# Patient Record
Sex: MALE | Race: WHITE | NOT HISPANIC OR LATINO | URBAN - METROPOLITAN AREA
[De-identification: names, ages, dates, MRNs, and addresses within clinical notes are randomized per-mention and may not be internally consistent; named-entity substitution may affect disease eponyms.]

---

## 2017-04-04 ENCOUNTER — OUTPATIENT (OUTPATIENT)
Dept: OUTPATIENT SERVICES | Facility: HOSPITAL | Age: 52
LOS: 1 days | Discharge: HOME | End: 2017-04-04

## 2017-04-04 DIAGNOSIS — Z98.89 OTHER SPECIFIED POSTPROCEDURAL STATES: Chronic | ICD-10-CM

## 2017-06-27 DIAGNOSIS — M54.2 CERVICALGIA: ICD-10-CM

## 2018-04-10 ENCOUNTER — EMERGENCY (EMERGENCY)
Facility: HOSPITAL | Age: 53
LOS: 0 days | Discharge: HOME | End: 2018-04-10
Attending: EMERGENCY MEDICINE | Admitting: INTERNAL MEDICINE

## 2018-04-10 VITALS
SYSTOLIC BLOOD PRESSURE: 139 MMHG | OXYGEN SATURATION: 97 % | DIASTOLIC BLOOD PRESSURE: 79 MMHG | RESPIRATION RATE: 18 BRPM | TEMPERATURE: 98 F | HEART RATE: 59 BPM

## 2018-04-10 VITALS
RESPIRATION RATE: 18 BRPM | OXYGEN SATURATION: 98 % | TEMPERATURE: 96 F | SYSTOLIC BLOOD PRESSURE: 146 MMHG | HEART RATE: 80 BPM | DIASTOLIC BLOOD PRESSURE: 94 MMHG

## 2018-04-10 DIAGNOSIS — Z88.5 ALLERGY STATUS TO NARCOTIC AGENT: ICD-10-CM

## 2018-04-10 DIAGNOSIS — I10 ESSENTIAL (PRIMARY) HYPERTENSION: ICD-10-CM

## 2018-04-10 DIAGNOSIS — Z98.89 OTHER SPECIFIED POSTPROCEDURAL STATES: Chronic | ICD-10-CM

## 2018-04-10 DIAGNOSIS — Z79.899 OTHER LONG TERM (CURRENT) DRUG THERAPY: ICD-10-CM

## 2018-04-10 DIAGNOSIS — R42 DIZZINESS AND GIDDINESS: ICD-10-CM

## 2018-04-10 DIAGNOSIS — R63.1 POLYDIPSIA: ICD-10-CM

## 2018-04-10 LAB
ACETONE SERPL-MCNC: NEGATIVE — SIGNIFICANT CHANGE UP
ALBUMIN SERPL ELPH-MCNC: 4.8 G/DL — SIGNIFICANT CHANGE UP (ref 3.5–5.2)
ALP SERPL-CCNC: 61 U/L — SIGNIFICANT CHANGE UP (ref 30–115)
ALT FLD-CCNC: 50 U/L — HIGH (ref 0–41)
ANION GAP SERPL CALC-SCNC: 12 MMOL/L — SIGNIFICANT CHANGE UP (ref 7–14)
APPEARANCE UR: (no result)
APTT BLD: 24.4 SEC — LOW (ref 27–39.2)
AST SERPL-CCNC: 42 U/L — HIGH (ref 0–41)
BACTERIA # UR AUTO: SIGNIFICANT CHANGE UP /HPF
BILIRUB SERPL-MCNC: 0.9 MG/DL — SIGNIFICANT CHANGE UP (ref 0.2–1.2)
BILIRUB UR-MCNC: NEGATIVE — SIGNIFICANT CHANGE UP
BUN SERPL-MCNC: 18 MG/DL — SIGNIFICANT CHANGE UP (ref 10–20)
CALCIUM SERPL-MCNC: 9.6 MG/DL — SIGNIFICANT CHANGE UP (ref 8.5–10.1)
CHLORIDE SERPL-SCNC: 97 MMOL/L — LOW (ref 98–110)
CK MB CFR SERPL CALC: <0.1 NG/ML — LOW (ref 0.6–6.3)
CK SERPL-CCNC: 55 U/L — SIGNIFICANT CHANGE UP (ref 0–225)
CO2 SERPL-SCNC: 30 MMOL/L — SIGNIFICANT CHANGE UP (ref 17–32)
COLOR SPEC: YELLOW — SIGNIFICANT CHANGE UP
CREAT SERPL-MCNC: 1 MG/DL — SIGNIFICANT CHANGE UP (ref 0.7–1.5)
DIFF PNL FLD: NEGATIVE — SIGNIFICANT CHANGE UP
EPI CELLS # UR: (no result) /HPF
GLUCOSE SERPL-MCNC: 108 MG/DL — HIGH (ref 70–99)
GLUCOSE UR QL: NEGATIVE MG/DL — SIGNIFICANT CHANGE UP
HCT VFR BLD CALC: 42.4 % — SIGNIFICANT CHANGE UP (ref 42–52)
HGB BLD-MCNC: 15.7 G/DL — SIGNIFICANT CHANGE UP (ref 14–18)
INR BLD: 1.1 RATIO — SIGNIFICANT CHANGE UP (ref 0.65–1.3)
KETONES UR-MCNC: NEGATIVE — SIGNIFICANT CHANGE UP
LEUKOCYTE ESTERASE UR-ACNC: NEGATIVE — SIGNIFICANT CHANGE UP
LIDOCAIN IGE QN: 35 U/L — SIGNIFICANT CHANGE UP (ref 7–60)
MCHC RBC-ENTMCNC: 33.4 PG — HIGH (ref 27–31)
MCHC RBC-ENTMCNC: 37 G/DL — SIGNIFICANT CHANGE UP (ref 32–37)
MCV RBC AUTO: 90.2 FL — SIGNIFICANT CHANGE UP (ref 80–94)
NITRITE UR-MCNC: NEGATIVE — SIGNIFICANT CHANGE UP
NRBC # BLD: 0 /100 WBCS — SIGNIFICANT CHANGE UP (ref 0–0)
PH UR: 6 — SIGNIFICANT CHANGE UP (ref 5–8)
PLATELET # BLD AUTO: 274 K/UL — SIGNIFICANT CHANGE UP (ref 130–400)
POTASSIUM SERPL-MCNC: 4.3 MMOL/L — SIGNIFICANT CHANGE UP (ref 3.5–5)
POTASSIUM SERPL-SCNC: 4.3 MMOL/L — SIGNIFICANT CHANGE UP (ref 3.5–5)
PROT SERPL-MCNC: 7.8 G/DL — SIGNIFICANT CHANGE UP (ref 6–8)
PROT UR-MCNC: NEGATIVE MG/DL — SIGNIFICANT CHANGE UP
PROTHROM AB SERPL-ACNC: 11.9 SEC — SIGNIFICANT CHANGE UP (ref 9.95–12.87)
RBC # BLD: 4.7 M/UL — SIGNIFICANT CHANGE UP (ref 4.7–6.1)
RBC # FLD: 11.7 % — SIGNIFICANT CHANGE UP (ref 11.5–14.5)
SODIUM SERPL-SCNC: 139 MMOL/L — SIGNIFICANT CHANGE UP (ref 135–146)
SP GR SPEC: 1.02 — SIGNIFICANT CHANGE UP (ref 1.01–1.03)
TROPONIN T SERPL-MCNC: <0.01 NG/ML — SIGNIFICANT CHANGE UP
UROBILINOGEN FLD QL: 1 MG/DL (ref 0.2–0.2)
WBC # BLD: 6.37 K/UL — SIGNIFICANT CHANGE UP (ref 4.8–10.8)
WBC # FLD AUTO: 6.37 K/UL — SIGNIFICANT CHANGE UP (ref 4.8–10.8)

## 2018-04-10 RX ORDER — MECLIZINE HCL 12.5 MG
50 TABLET ORAL ONCE
Qty: 0 | Refills: 0 | Status: COMPLETED | OUTPATIENT
Start: 2018-04-10 | End: 2018-04-10

## 2018-04-10 RX ORDER — SODIUM CHLORIDE 9 MG/ML
1000 INJECTION INTRAMUSCULAR; INTRAVENOUS; SUBCUTANEOUS ONCE
Qty: 0 | Refills: 0 | Status: COMPLETED | OUTPATIENT
Start: 2018-04-10 | End: 2018-04-10

## 2018-04-10 RX ORDER — MECLIZINE HCL 12.5 MG
2 TABLET ORAL
Qty: 42 | Refills: 0
Start: 2018-04-10 | End: 2018-04-16

## 2018-04-10 RX ADMIN — SODIUM CHLORIDE 1000 MILLILITER(S): 9 INJECTION INTRAMUSCULAR; INTRAVENOUS; SUBCUTANEOUS at 15:31

## 2018-04-10 RX ADMIN — Medication 50 MILLIGRAM(S): at 15:31

## 2018-04-10 NOTE — ED ADULT NURSE NOTE - PMH
Acid reflux disease    Allergic rhinitis, unspecified allergic rhinitis type    HLD (hyperlipidemia)    HTN (hypertension)

## 2018-04-10 NOTE — ED PROVIDER NOTE - MEDICAL DECISION MAKING DETAILS
please see progress notes, pt reports feeling much better, repeat exam with no nystagmus, nih 0, aware of signs and symptoms to return for, will follow up.

## 2018-04-10 NOTE — ED PROVIDER NOTE - NS ED ROS FT
(+) dizziness like room is spinning, nausea, polydipsia. denies fever, chills, vomiting, cp, sob, pleuritic cp, palpitations, diaphoresis, cough, tinnitus, ear pain, hearing loss, neck pain/stiffness, (has chronic back pain from herniated discs), photophobia/phonophobia, blurry vision/visual changes, abd pain, current diarrhea, constipation, melena/brbpr, urinary symptoms, weakness, numbness/tingling, HA, syncope, sick contacts, recent travel or rash. ex-smoker- quite seven years ago.

## 2018-04-10 NOTE — ED ADULT TRIAGE NOTE - CHIEF COMPLAINT QUOTE
Pt started on diclofenac and HCTZ two weeks ago, pt c/o dizziness and nausea, cold sweats on Saturday while out to eat, pt also reports similar symptoms prior to starting medication with palpitations, now c/o decreased appetite and dizziness

## 2018-04-10 NOTE — ED PROVIDER NOTE - OBJECTIVE STATEMENT
A 53 y/o m w/ pmhx of herniated discs, tendonitis of L wrist (recently started on diclofenac 75 mg twice a day on april 2nd) and htn (recently started on hctx 25 mg once a day on april 2nd) presents for dizziness like the room is spinning associated with nausea, and polydipsia. pt reports has felt this once before and was seen by his pmd Dr. Ortiz on march 26th, had blood drawn and when he followed on april 2nd was started on the meds as was todl he had htn and dld. symptoms worsened this saturday (~ 4 days ago), has been intermittent, had diarrhea on friday. was told not diabetic. denies fever, chills, vomiting, cp, sob, pleuritic cp, palpitations, diaphoresis, cough, tinnitus, ear pain, hearing loss, neck pain/stiffness, (has chronic back pain from herniated discs), photophobia/phonophobia, blurry vision/visual changes, abd pain, current diarrhea, constipation, melena/brbpr, urinary symptoms, weakness, numbness/tingling, HA, syncope, sick contacts, recent travel or rash. ex-smoker- quite seven years ago. pt was concerned may be reaction to the meds he was statrted on so came to ed.

## 2018-04-10 NOTE — ED PROVIDER NOTE - PHYSICAL EXAMINATION
on exam: wdwn male sitting on stretcher in nad,no rash, no signs of trauma, PERRL, EOM intact, (+) L sided Horizontal nystagmus that fatigues, TM's visualzied b/l w/ good cone of light, no erythema or effusions, no cerumen impaction, mmm, neck supple, no spinous ttp to neck or back, FROM, no palpable shelves or step offs, no meningeal signs, regular rate, radial pulses 2/4 b/l, ctabl w/ breath sounds present b/l, no wheezing or crackles, good air exchange, good respiratory effort, no accessory muscle use, no tachypnea, no stridor, bs present throughout all 4 quadrants, abd soft, nd, nt, no rebound tenderness or guarding, no cvat, FROM of upper an lower ext, no calf pain/swelling/erythema, AAOx3. Motor 5/5 and sensation intact throughout upper and lower ext. No drift, CN II-XII intact. No facial droop or slurring of speech. (-) Pronator (-) Romberg, no dysmetria w/ ftn or rapid alternating fine movements, heel to shin intact, ambulating with no ataxia or difficulty.NIH O.

## 2018-04-10 NOTE — ED PROVIDER NOTE - CARE PLAN
Assessment and plan of treatment:	Plan: EKG, CXR, labs, ct head, urine, fluids, meclizine, reassess. Principal Discharge DX:	Dizziness  Assessment and plan of treatment:	Plan: EKG, CXR, labs, ct head, urine, fluids, meclizine, reassess.  Secondary Diagnosis:	Nausea

## 2018-05-10 ENCOUNTER — APPOINTMENT (OUTPATIENT)
Dept: CARDIOLOGY | Facility: CLINIC | Age: 53
End: 2018-05-10

## 2018-05-10 VITALS
WEIGHT: 182 LBS | HEART RATE: 58 BPM | DIASTOLIC BLOOD PRESSURE: 80 MMHG | HEIGHT: 68 IN | BODY MASS INDEX: 27.58 KG/M2 | SYSTOLIC BLOOD PRESSURE: 102 MMHG

## 2018-06-13 ENCOUNTER — APPOINTMENT (OUTPATIENT)
Dept: CARDIOLOGY | Facility: CLINIC | Age: 53
End: 2018-06-13

## 2018-06-20 NOTE — ED PROVIDER NOTE - PROGRESS NOTE DETAILS
actual pt reports feeling much better, no symptoms, no dizziness, no nausea or vomiting, all labs results printed and reviewed, pt aware of imp of follow up with pmd, neuology, will also give ent, reports meclizine helped will give perscription as discussed, aware of signs and symptoms to return for, will follow up. standing

## 2018-08-15 ENCOUNTER — APPOINTMENT (OUTPATIENT)
Dept: CARDIOLOGY | Facility: CLINIC | Age: 53
End: 2018-08-15

## 2018-08-15 VITALS
HEIGHT: 68 IN | HEART RATE: 68 BPM | BODY MASS INDEX: 26.83 KG/M2 | SYSTOLIC BLOOD PRESSURE: 130 MMHG | DIASTOLIC BLOOD PRESSURE: 82 MMHG | WEIGHT: 177 LBS

## 2018-08-15 PROBLEM — I10 ESSENTIAL (PRIMARY) HYPERTENSION: Chronic | Status: ACTIVE | Noted: 2018-04-10

## 2018-12-13 ENCOUNTER — APPOINTMENT (OUTPATIENT)
Dept: CARDIOLOGY | Facility: CLINIC | Age: 53
End: 2018-12-13

## 2019-03-29 ENCOUNTER — OUTPATIENT (OUTPATIENT)
Dept: OUTPATIENT SERVICES | Facility: HOSPITAL | Age: 54
LOS: 1 days | Discharge: HOME | End: 2019-03-29

## 2019-03-29 DIAGNOSIS — Z00.00 ENCOUNTER FOR GENERAL ADULT MEDICAL EXAMINATION WITHOUT ABNORMAL FINDINGS: ICD-10-CM

## 2019-03-29 DIAGNOSIS — Z98.89 OTHER SPECIFIED POSTPROCEDURAL STATES: Chronic | ICD-10-CM

## 2019-03-29 DIAGNOSIS — N40.0 BENIGN PROSTATIC HYPERPLASIA WITHOUT LOWER URINARY TRACT SYMPTOMS: ICD-10-CM

## 2019-03-29 DIAGNOSIS — E78.00 PURE HYPERCHOLESTEROLEMIA, UNSPECIFIED: ICD-10-CM

## 2019-03-29 DIAGNOSIS — D51.0 VITAMIN B12 DEFICIENCY ANEMIA DUE TO INTRINSIC FACTOR DEFICIENCY: ICD-10-CM

## 2020-01-07 ENCOUNTER — APPOINTMENT (OUTPATIENT)
Dept: PLASTIC SURGERY | Facility: CLINIC | Age: 55
End: 2020-01-07
Payer: OTHER MISCELLANEOUS

## 2020-01-07 VITALS — HEIGHT: 68 IN | WEIGHT: 177 LBS | BODY MASS INDEX: 26.83 KG/M2

## 2020-01-07 PROCEDURE — 99203 OFFICE O/P NEW LOW 30 MIN: CPT

## 2020-01-07 RX ORDER — FLUTICASONE PROPIONATE 50 UG/1
50 SPRAY, METERED NASAL DAILY
Qty: 1 | Refills: 2 | Status: DISCONTINUED | COMMUNITY
End: 2020-01-07

## 2020-01-07 RX ORDER — MECLIZINE HYDROCHLORIDE 25 MG/1
25 TABLET ORAL
Refills: 0 | Status: DISCONTINUED | COMMUNITY
End: 2020-01-07

## 2020-01-07 NOTE — HISTORY OF PRESENT ILLNESS
[FreeTextEntry1] : Pt is a 55 y/o M with PMH of GERD and chronic back pain, RHD, who presents for evaluation of BL hand pain which he reports started after wrapping electrical wires with tape at work March 2019. Pt saw Dr. Hardin and was dx with BL BJA and cubital tunnel syndrome; he given BL kenalog injections and advised to wear BL thumb spicas. He states Dr. Hardin offered surgery but reports he would not be able to return to work after.\par \par Now c/o BL hand numbness and thumb pain. Here for second opinion.\par \par Quit smoking 7 years ago\par Nondiabetic

## 2020-01-07 NOTE — PHYSICAL EXAM
[de-identified] : well-appearing, NAD [de-identified] : BL hands with FROM, diminished sensation to ulnar 2 digits, slightly weakly finger abduction, +Tinels at elbow, negative Phalen's, + grind test BL

## 2020-01-07 NOTE — ASSESSMENT
[FreeTextEntry1] : 53 y/o M with BL hand pain c/w cubital tunnel syndrome and BJA s/p kenalog x1 to each joint and thumb spica\par \par as above\par on exam B/L BJA\par also signs B/L CTS\par AIN intact  UNM intact  slightly diminished sensation ulnar digits B/L\par \par suggest repeat xrays and B/L EMG\par \par all ?s answered\par \par f/u afetr studies

## 2020-06-04 ENCOUNTER — APPOINTMENT (OUTPATIENT)
Dept: PLASTIC SURGERY | Facility: CLINIC | Age: 55
End: 2020-06-04
Payer: OTHER MISCELLANEOUS

## 2020-06-04 PROCEDURE — 20550 NJX 1 TENDON SHEATH/LIGAMENT: CPT

## 2020-06-04 PROCEDURE — 99212 OFFICE O/P EST SF 10 MIN: CPT | Mod: 25

## 2020-06-04 NOTE — PHYSICAL EXAM
[de-identified] : BL hands with FROM, diminished sensation to ulnar 2 digits, slightly weakly finger abduction, +Tinels at elbow, negative Phalen's, + grind test BL [de-identified] : well-appearing, NAD

## 2020-06-04 NOTE — ASSESSMENT
[FreeTextEntry1] : 53 y/o M with BL hand pain c/w cubital tunnel syndrome and BJA s/p kenalog x1 to each joint and thumb spica\par \par as above\par on exam B/L BJA\par also signs B/L CTS\par AIN intact  UNM intact  slightly diminished sensation ulnar digits B/L\par \par suggest repeat xrays and B/L EMG\par \par all ?s answered\par \par f/u afetr studies\par \par as above\par \par bilateral hand sxs have not changed\par EMG obtained but full report not available\par xrays obtained but images not available\par \par 5mg kenalog injected into bilateral basal joints--tolerated well\par soft thumb spica splint\par \par f/u in 6 wks\par will need to view images and see full EMG report

## 2020-06-05 NOTE — ED ADULT TRIAGE NOTE - TEMPERATURE IN FAHRENHEIT (DEGREES F)
Pt here for [address].   Arrives Ambulating independently, accompanied by Self           Patient reports possible pregnancy since last therapy cycle: No    Modifications in dose or schedule: No     Frequency of blood return and site check throughout admini
96.3

## 2020-07-16 ENCOUNTER — APPOINTMENT (OUTPATIENT)
Dept: PLASTIC SURGERY | Facility: CLINIC | Age: 55
End: 2020-07-16
Payer: OTHER MISCELLANEOUS

## 2020-07-16 DIAGNOSIS — M79.641 PAIN IN RIGHT HAND: ICD-10-CM

## 2020-07-16 DIAGNOSIS — M79.642 PAIN IN RIGHT HAND: ICD-10-CM

## 2020-07-16 PROCEDURE — 99212 OFFICE O/P EST SF 10 MIN: CPT | Mod: 25

## 2020-07-16 PROCEDURE — 20550 NJX 1 TENDON SHEATH/LIGAMENT: CPT

## 2020-07-16 NOTE — HISTORY OF PRESENT ILLNESS
[FreeTextEntry1] : Pt is a 55 y/o M with PMH of GERD and chronic back pain, RHD, who presents for evaluation of BL hand pain which he reports started after wrapping electrical wires with tape at work March 2019. Pt saw Dr. Hardin and was dx with BL BJA and cubital tunnel syndrome; he given BL kenalog injections and advised to wear BL thumb spicas. He states Dr. Hardin offered surgery but reports he would not be able to return to work after.\par \par Now c/o BL hand numbness and thumb pain. Here for second opinion.\par \par Quit smoking 7 years ago\par Nondiabetic\par \par Interval hx (7/16/20). Patient presents today for f/u to discuss treatment. EMG from February 2020 revealed bilateral cubital tunnel syndrome, no CTS. Reports improvement in basal joint pain after Kenalog injection last visit.

## 2020-07-16 NOTE — ASSESSMENT
[FreeTextEntry1] : 53 y/o M with BL hand pain c/w cubital tunnel syndrome and BJA s/p kenalog x2 to each joint and thumb spica.\par \par EMG reviewed--bilateral cubital tunnel syndrome\par \par suggest decompressions\par \par Regarding the hand surgery, we discussed the risk of bleeding, infection, need for additional unplanned surgery, need for postoperative hand occupational therapy, possible lack of improvement and diminished hand function.  We discussed prolonged recovery.  All questions were answered and all risks were well understood by the patient.\par \par had positive repsonse to prior BJ injection (50% better)\par requests additional injections today\par \par injected 5mg kenalog in to bilaterl BJ--tolerated fine\par \par scheduleing for cubitral tunnel surgery \par \par \par \par \par \par

## 2020-07-16 NOTE — PHYSICAL EXAM
[de-identified] : BL hands with FROM, diminished sensation to ulnar 2 digits, slightly weakly finger abduction, +Tinels at elbow, negative Phalen's, + grind test BL [de-identified] : well-appearing, NAD

## 2020-09-18 ENCOUNTER — OUTPATIENT (OUTPATIENT)
Dept: OUTPATIENT SERVICES | Facility: HOSPITAL | Age: 55
LOS: 1 days | Discharge: HOME | End: 2020-09-18
Payer: OTHER MISCELLANEOUS

## 2020-09-18 VITALS
OXYGEN SATURATION: 96 % | HEART RATE: 62 BPM | DIASTOLIC BLOOD PRESSURE: 90 MMHG | WEIGHT: 185.19 LBS | SYSTOLIC BLOOD PRESSURE: 143 MMHG | TEMPERATURE: 98 F | HEIGHT: 70 IN | RESPIRATION RATE: 16 BRPM

## 2020-09-18 DIAGNOSIS — I26.99 OTHER PULMONARY EMBOLISM WITHOUT ACUTE COR PULMONALE: Chronic | ICD-10-CM

## 2020-09-18 DIAGNOSIS — Z01.818 ENCOUNTER FOR OTHER PREPROCEDURAL EXAMINATION: ICD-10-CM

## 2020-09-18 DIAGNOSIS — Z87.19 PERSONAL HISTORY OF OTHER DISEASES OF THE DIGESTIVE SYSTEM: Chronic | ICD-10-CM

## 2020-09-18 DIAGNOSIS — Z98.89 OTHER SPECIFIED POSTPROCEDURAL STATES: Chronic | ICD-10-CM

## 2020-09-18 DIAGNOSIS — Z87.81 PERSONAL HISTORY OF (HEALED) TRAUMATIC FRACTURE: Chronic | ICD-10-CM

## 2020-09-18 DIAGNOSIS — G56.23 LESION OF ULNAR NERVE, BILATERAL UPPER LIMBS: ICD-10-CM

## 2020-09-18 LAB
APTT BLD: 27.2 SEC — SIGNIFICANT CHANGE UP (ref 27–39.2)
INR BLD: 1.02 RATIO — SIGNIFICANT CHANGE UP (ref 0.65–1.3)
PROTHROM AB SERPL-ACNC: 11.7 SEC — SIGNIFICANT CHANGE UP (ref 9.95–12.87)

## 2020-09-18 PROCEDURE — 93010 ELECTROCARDIOGRAM REPORT: CPT

## 2020-09-18 NOTE — H&P PST ADULT - HISTORY OF PRESENT ILLNESS
Pt states he has chronic back pain and also has bilateral numbness, pain and weakness of his arms to the point he can't complete his job duties as a  for [employer]. Above procedure was recommended by his surgeon.  PATIENT CURRENTLY DENIES CHEST PAIN , SHORTNESS OF BREATH,  PALPITATIONS,  DYSURIA, OR UPPER RESPIRATORY INFECTION IN PAST 2 WEEKS. EXERCISE  TOLERANCE  1-2 FLIGHT OF STAIRS  WITHOUT SHORTNESS OF BREATH.  Anesthesia Alert  NO--Difficult Airway  NO--History of neck surgery or radiation  NO--Limited ROM of neck  NO--History of Malignant hyperthermia  NO--Personal or family history of Pseudocholinesterase deficiency  NO--Prior Anesthesia Complication  NO--Latex Allergy  NO--Loose teeth  NO--History of Rheumatoid Arthritis  NO--GLADIS  NO--Other_____

## 2020-09-18 NOTE — H&P PST ADULT - NSANTHOSAYNRD_GEN_A_CORE
No. GLADIS screening performed.  STOP BANG Legend: 0-2 = LOW Risk; 3-4 = INTERMEDIATE Risk; 5-8 = HIGH Risk

## 2020-09-18 NOTE — H&P PST ADULT - REASON FOR ADMISSION
Pleasant 56 yo here for PAST for Decompression of Bilateral Ulnar Nerves of Elbow under GA by Dr Farah on 9/28 at [facility]

## 2020-09-18 NOTE — H&P PST ADULT - NSICDXPASTSURGICALHX_GEN_ALL_CORE_FT
PAST SURGICAL HISTORY:  H/O hiatal hernia mom    History of broken nose     History of hernia surgery incarcerated inguinal hernia 5 years ago    History of right knee surgery minescus repair

## 2020-09-18 NOTE — H&P PST ADULT - NSICDXFAMILYHX_GEN_ALL_CORE_FT
FAMILY HISTORY:  Family history of thrombosis or embolism, DVT -dad  FH: diabetes mellitus, dad  FH: heart disease, dad  FH: HTN (hypertension), both parents  FH: thyroid disease

## 2020-09-25 ENCOUNTER — LABORATORY RESULT (OUTPATIENT)
Age: 55
End: 2020-09-25

## 2020-09-25 ENCOUNTER — OUTPATIENT (OUTPATIENT)
Dept: OUTPATIENT SERVICES | Facility: HOSPITAL | Age: 55
LOS: 1 days | Discharge: HOME | End: 2020-09-25

## 2020-09-25 DIAGNOSIS — Z11.59 ENCOUNTER FOR SCREENING FOR OTHER VIRAL DISEASES: ICD-10-CM

## 2020-09-25 DIAGNOSIS — Z87.81 PERSONAL HISTORY OF (HEALED) TRAUMATIC FRACTURE: Chronic | ICD-10-CM

## 2020-09-25 DIAGNOSIS — Z87.19 PERSONAL HISTORY OF OTHER DISEASES OF THE DIGESTIVE SYSTEM: Chronic | ICD-10-CM

## 2020-09-25 DIAGNOSIS — Z98.89 OTHER SPECIFIED POSTPROCEDURAL STATES: Chronic | ICD-10-CM

## 2020-09-25 NOTE — ASU PATIENT PROFILE, ADULT - PSH
H/O hiatal hernia  mom  History of broken nose    History of hernia surgery  incarcerated inguinal hernia 5 years ago  History of right knee surgery  minescus repair

## 2020-09-28 ENCOUNTER — APPOINTMENT (OUTPATIENT)
Dept: PLASTIC SURGERY | Facility: AMBULATORY SURGERY CENTER | Age: 55
End: 2020-09-28
Payer: OTHER MISCELLANEOUS

## 2020-09-28 ENCOUNTER — OUTPATIENT (OUTPATIENT)
Dept: OUTPATIENT SERVICES | Facility: HOSPITAL | Age: 55
LOS: 1 days | Discharge: HOME | End: 2020-09-28

## 2020-09-28 VITALS
WEIGHT: 185.19 LBS | HEART RATE: 67 BPM | TEMPERATURE: 98 F | HEIGHT: 70 IN | SYSTOLIC BLOOD PRESSURE: 156 MMHG | OXYGEN SATURATION: 96 % | DIASTOLIC BLOOD PRESSURE: 95 MMHG | RESPIRATION RATE: 17 BRPM

## 2020-09-28 VITALS
RESPIRATION RATE: 15 BRPM | HEART RATE: 60 BPM | OXYGEN SATURATION: 98 % | SYSTOLIC BLOOD PRESSURE: 156 MMHG | DIASTOLIC BLOOD PRESSURE: 89 MMHG

## 2020-09-28 DIAGNOSIS — Z98.89 OTHER SPECIFIED POSTPROCEDURAL STATES: Chronic | ICD-10-CM

## 2020-09-28 DIAGNOSIS — Z87.19 PERSONAL HISTORY OF OTHER DISEASES OF THE DIGESTIVE SYSTEM: Chronic | ICD-10-CM

## 2020-09-28 DIAGNOSIS — Z87.81 PERSONAL HISTORY OF (HEALED) TRAUMATIC FRACTURE: Chronic | ICD-10-CM

## 2020-09-28 PROCEDURE — 64718 REVISE ULNAR NERVE AT ELBOW: CPT

## 2020-09-28 RX ORDER — HYDROMORPHONE HYDROCHLORIDE 2 MG/ML
0.5 INJECTION INTRAMUSCULAR; INTRAVENOUS; SUBCUTANEOUS
Refills: 0 | Status: DISCONTINUED | OUTPATIENT
Start: 2020-09-28 | End: 2020-09-28

## 2020-09-28 RX ORDER — SODIUM CHLORIDE 9 MG/ML
1000 INJECTION, SOLUTION INTRAVENOUS
Refills: 0 | Status: DISCONTINUED | OUTPATIENT
Start: 2020-09-28 | End: 2020-10-12

## 2020-09-28 RX ORDER — ONDANSETRON 8 MG/1
4 TABLET, FILM COATED ORAL ONCE
Refills: 0 | Status: DISCONTINUED | OUTPATIENT
Start: 2020-09-28 | End: 2020-10-12

## 2020-09-28 RX ORDER — TRAMADOL HYDROCHLORIDE 50 MG/1
1 TABLET ORAL
Qty: 10 | Refills: 0
Start: 2020-09-28

## 2020-09-28 RX ADMIN — SODIUM CHLORIDE 100 MILLILITER(S): 9 INJECTION, SOLUTION INTRAVENOUS at 14:10

## 2020-09-28 NOTE — ASU DISCHARGE PLAN (ADULT/PEDIATRIC) - ASU DC SPECIAL INSTRUCTIONSFT
Diet: You may resume your usual diet. Avoid alcohol and excessive salt intake for two weeks following surgery. This will help to minimize swelling.    Medication: Pain medication (Tramadol) has also been sent to your pharmacy for any moderate to severe pain you may experience. Alternatively, you may take Tylenol for mild discomfort. Remember, no Aspirin or NSAIDS (Advil, Motrin, Aleve) as they may increase bruising.    Activity: Keep your arms elevated as much as possible to reduce swelling. When sitting down, use pillows to elevate. You may take care of your personal needs as desired, however, no lifting or strenuous activity is allowed for 4 weeks following surgery. Driving is not permitted for at least two weeks.    Wound care: Keep your dressing clean, dry, and intact until seen by MD/PA. Do not smoke! It delays wound healing and increases the risk of complications.    Personal Hygiene: Do not get the operative area wet. You are allowed to sponge bathe. No tub soaking. You may shower provided the hand is wrapped with a plastic bag.    Things to expect: The operative area may be bruised, swollen, and painful. You may also have temporary numbness and stiffness which will improve over time. Hand therapy will be helpful to obtain maximum function and full range of motion. It will be started after the first or second post-operative visit.    In case of emergency: call the office any time day or night. Post-operative care will be provided in the office one week following surgery. If you do not already have an appointment, please call during regular office hours to schedule: [phone].     We wish you a pleasant recovery.

## 2020-09-28 NOTE — BRIEF OPERATIVE NOTE - NSICDXBRIEFPROCEDURE_GEN_ALL_CORE_FT
PROCEDURES:  Decompression of both ulnar nerves at elbow 28-Sep-2020 14:03:20  Tamela Mcallister L

## 2020-09-28 NOTE — CHART NOTE - NSCHARTNOTEFT_GEN_A_CORE
PACU ANESTHESIA ADMISSION NOTE      Procedure: Decompression of both ulnar nerves at elbow      Post op diagnosis:  Cubital tunnel syndrome, bilateral        ____  Intubated  TV:______       Rate: ______      FiO2: ______    __x__  Patent Airway    ___x_  Full return of protective reflexes    __x__  Full recovery from anesthesia / back to baseline     Vitals:   T:  97.5         R:   14               BP:   126/74               Sat:  99                 P: 63      Mental Status:  ___x_ Awake   ___x__ Alert   _____ Drowsy   _____ Sedated    Nausea/Vomiting:  __x__ NO  ______Yes,   See Post - Op Orders          Pain Scale (0-10):  _____    Treatment: _x___ None    ____ See Post - Op/PCA Orders    Post - Operative Fluids:   ____ Oral   __x__ See Post - Op Orders    Plan: Discharge:   __x__Home       _____Floor     _____Critical Care    _____  Other:_________________    Comments:  Pt brought to RR spontaneously breathing, hemodynamically stable

## 2020-10-05 ENCOUNTER — APPOINTMENT (OUTPATIENT)
Dept: PLASTIC SURGERY | Facility: CLINIC | Age: 55
End: 2020-10-05
Payer: COMMERCIAL

## 2020-10-05 PROCEDURE — 99024 POSTOP FOLLOW-UP VISIT: CPT

## 2020-10-05 NOTE — HISTORY OF PRESENT ILLNESS
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 Pt saw Dr. Hardin and was dx with BL BJA and cubital tunnel syndrome; he given BL kenalog injections and advised to wear BL thumb spicas. He states Dr. Hardin offered surgery but reports he would not be able to return to work after.\par \par Now c/o BL hand numbness and thumb pain. Here for second opinion.\par \par Quit smoking 7 years ago\par Nondiabetic\par \par Interval hx (7/16/20). Patient presents today for f/u to discuss treatment. EMG from February 2020 revealed bilateral cubital tunnel syndrome, no CTS. Reports improvement in basal joint pain after Kenalog injection last visit. \par \par Interval hx (10/5/20). Patient presents today POD#7 s/p BL cubital tunnel release. Doing well with appropriate incisional discomfort. Denies any fever, chills or bleeding.

## 2020-10-05 NOTE — ASSESSMENT
[FreeTextEntry1] : 56 y/o M with BL hand pain c/w cubital tunnel syndrome and BJA s/p kenalog x2 to each joint and thumb spica.\par Now POD#7 s/p BL release of cubital tunnel. Doing well. \par \par - dressings changed\par - post-op instructions discussed\par - f/u next week for suture removal

## 2020-10-05 NOTE — PHYSICAL EXAM
[de-identified] : well-appearing, NAD [de-identified] : BL hands with FROM, b/l elbow incisions healing appropriately, c/d/i, no cellulitis or fluid collection

## 2020-10-11 DIAGNOSIS — G56.23 LESION OF ULNAR NERVE, BILATERAL UPPER LIMBS: ICD-10-CM

## 2020-10-11 DIAGNOSIS — K21.9 GASTRO-ESOPHAGEAL REFLUX DISEASE WITHOUT ESOPHAGITIS: ICD-10-CM

## 2020-10-11 DIAGNOSIS — Z88.5 ALLERGY STATUS TO NARCOTIC AGENT: ICD-10-CM

## 2020-10-11 DIAGNOSIS — E78.5 HYPERLIPIDEMIA, UNSPECIFIED: ICD-10-CM

## 2020-10-11 DIAGNOSIS — Z87.891 PERSONAL HISTORY OF NICOTINE DEPENDENCE: ICD-10-CM

## 2020-10-11 DIAGNOSIS — I10 ESSENTIAL (PRIMARY) HYPERTENSION: ICD-10-CM

## 2020-10-14 ENCOUNTER — APPOINTMENT (OUTPATIENT)
Dept: PLASTIC SURGERY | Facility: CLINIC | Age: 55
End: 2020-10-14
Payer: COMMERCIAL

## 2020-10-14 PROCEDURE — 99024 POSTOP FOLLOW-UP VISIT: CPT

## 2020-10-14 NOTE — ASSESSMENT
[FreeTextEntry1] : 54 y/o M with BL hand pain c/w cubital tunnel syndrome and BJA s/p kenalog x2 to each joint and thumb spica.\par Now POD#15 s/p BL release of cubital tunnel. Doing well. \par \par -Sutures removed\par -Steri strips applied\par -OT rx given\par -F/u 6 weeks with Dr. Farah\par \par

## 2020-10-14 NOTE — HISTORY OF PRESENT ILLNESS
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 Pt saw Dr. Hardin and was dx with BL BJA and cubital tunnel syndrome; he given BL kenalog injections and advised to wear BL thumb spicas. He states Dr. Hardin offered surgery but reports he would not be able to return to work after.\par \par Now c/o BL hand numbness and thumb pain. Here for second opinion.\par \par Quit smoking 7 years ago\par Nondiabetic\par \par Interval hx (7/16/20). Patient presents today for f/u to discuss treatment. EMG from February 2020 revealed bilateral cubital tunnel syndrome, no CTS. Reports improvement in basal joint pain after Kenalog injection last visit. \par \par Interval hx (10/5/20). Patient presents today POD#7 s/p BL cubital tunnel release. Doing well with appropriate incisional discomfort. Denies any fever, chills or bleeding. \par \par Interval hx (10/14/20). Patient presents today POD#16 s/p BL cubital tunnel release. Offers no complaints.

## 2020-10-14 NOTE — PHYSICAL EXAM
[de-identified] : well-appearing, NAD [de-identified] : BL hands with FROM, b/l elbow incisions healing appropriately, c/d/i, no cellulitis or fluid collection

## 2020-11-24 ENCOUNTER — APPOINTMENT (OUTPATIENT)
Dept: PLASTIC SURGERY | Facility: CLINIC | Age: 55
End: 2020-11-24
Payer: OTHER MISCELLANEOUS

## 2020-11-24 DIAGNOSIS — G56.20 LESION OF ULNAR NERVE, UNSPECIFIED UPPER LIMB: ICD-10-CM

## 2020-11-24 PROCEDURE — 20550 NJX 1 TENDON SHEATH/LIGAMENT: CPT | Mod: 58

## 2020-11-24 PROCEDURE — 99024 POSTOP FOLLOW-UP VISIT: CPT

## 2020-11-24 NOTE — PHYSICAL EXAM
[de-identified] : well-appearing, NAD [de-identified] : BL hands with FROM, b/l elbow incisions healing appropriately, scars soft and flat

## 2020-11-24 NOTE — ASSESSMENT
[FreeTextEntry1] : 56 y/o M with BL hand pain c/w cubital tunnel syndrome and BJA s/p kenalog x2 to each joint and thumb spica.\par Now 2 months s/p BL release of cubital tunnel. Doing well. \par \par Due to COVID 19, pre-visit patient instructions were explained to the patient and their symptoms were checked upon arrival.  \par Masks were used by the health care providers and staff and the examination room was cleaned after the patient visit was completed.\par \par doing very well s/p cubital tunnel syndrome approx 7 wks ago--doing very well\par \par now w return of B/L BJA\par \par injected 5mg kenalog into B/L BJ\par lawanda well\par soft thumb spica splints\par

## 2020-11-24 NOTE — HISTORY OF PRESENT ILLNESS
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 Pt saw Dr. Hardin and was dx with BL BJA and cubital tunnel syndrome; he given BL kenalog injections and advised to wear BL thumb spicas. He states Dr. Hardin offered surgery but reports he would not be able to return to work after.\par \par Now c/o BL hand numbness and thumb pain. Here for second opinion.\par \par Quit smoking 7 years ago\par Nondiabetic\par \par Interval hx (7/16/20). Patient presents today for f/u to discuss treatment. EMG from February 2020 revealed bilateral cubital tunnel syndrome, no CTS. Reports improvement in basal joint pain after Kenalog injection last visit. \par \par Interval hx (10/5/20). Patient presents today POD#7 s/p BL cubital tunnel release. Doing well with appropriate incisional discomfort. Denies any fever, chills or bleeding. \par \par Interval hx (10/14/20): Patient presents today POD#16 s/p BL cubital tunnel release. Offers no complaints.\par \par Interval hx (11/24/20): Pt presents today 2 months s/p BL cubital tunnel release. Going to OT with improvement. Still c/o occasional BL elbow pain and electric shock sensation when touched.

## 2021-01-19 ENCOUNTER — APPOINTMENT (OUTPATIENT)
Dept: PLASTIC SURGERY | Facility: CLINIC | Age: 56
End: 2021-01-19
Payer: OTHER MISCELLANEOUS

## 2021-01-19 PROCEDURE — 99212 OFFICE O/P EST SF 10 MIN: CPT

## 2021-01-19 PROCEDURE — 99072 ADDL SUPL MATRL&STAF TM PHE: CPT

## 2021-01-19 NOTE — ASSESSMENT
[FreeTextEntry1] : 56 y/o M with BL hand pain c/w cubital tunnel syndrome and BJA s/p kenalog x2 to each joint and thumb spica.\par Now 2 months s/p BL release of cubital tunnel. Doing well. \par \par Due to COVID 19, pre-visit patient instructions were explained to the patient and their symptoms were checked upon arrival.  \par Masks were used by the health care providers and staff and the examination room was cleaned after the patient visit was completed.\par \par doing very well s/p cubital tunnel syndrome approx 7 wks ago--doing very well\par \par now w return of B/L BJA\par \par injected 5mg kenalog into B/L BJ\par lawanda well\par soft thumb spica splints\par \par \par doing Hand OPT w Bhavik for B/L cubtial tunnel\par \par numbeness and hand f(x) improving dramatically\par no issues\par \par f/u 6 months or prn\par \par Due to COVID 19, pre-visit patient instructions were explained to the patient and their symptoms were checked upon arrival.  \par Masks were used by the health care providers and staff and the examination room was cleaned after the patient visit was completed.\par \par

## 2021-01-19 NOTE — PHYSICAL EXAM
[de-identified] : well-appearing, NAD [de-identified] : BL hands with FROM, b/l elbow incisions healing appropriately, scars soft and flat

## 2021-01-19 NOTE — HISTORY OF PRESENT ILLNESS
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 Pt saw Dr. Hradin and was dx with BL BJA and cubital tunnel syndrome; he given BL kenalog injections and advised to wear BL thumb spicas. He states Dr. Hardin offered surgery but reports he would not be able to return to work after.\par \par Now c/o BL hand numbness and thumb pain. Here for second opinion.\par \par Quit smoking 7 years ago\par Nondiabetic\par \par Interval hx (7/16/20). Patient presents today for f/u to discuss treatment. EMG from February 2020 revealed bilateral cubital tunnel syndrome, no CTS. Reports improvement in basal joint pain after Kenalog injection last visit. \par \par Interval hx (10/5/20). Patient presents today POD#7 s/p BL cubital tunnel release. Doing well with appropriate incisional discomfort. Denies any fever, chills or bleeding. \par \par Interval hx (10/14/20): Patient presents today POD#16 s/p BL cubital tunnel release. Offers no complaints.\par \par Interval hx (11/24/20): Pt presents today 2 months s/p BL cubital tunnel release. Going to OT with improvement. Still c/o occasional BL elbow pain and electric shock sensation when touched.

## 2021-01-21 ENCOUNTER — OUTPATIENT (OUTPATIENT)
Dept: OUTPATIENT SERVICES | Facility: HOSPITAL | Age: 56
LOS: 1 days | Discharge: HOME | End: 2021-01-21

## 2021-01-21 DIAGNOSIS — M79.641 PAIN IN RIGHT HAND: ICD-10-CM

## 2021-01-21 DIAGNOSIS — Z98.89 OTHER SPECIFIED POSTPROCEDURAL STATES: Chronic | ICD-10-CM

## 2021-01-21 DIAGNOSIS — Z87.81 PERSONAL HISTORY OF (HEALED) TRAUMATIC FRACTURE: Chronic | ICD-10-CM

## 2021-01-21 DIAGNOSIS — Z87.19 PERSONAL HISTORY OF OTHER DISEASES OF THE DIGESTIVE SYSTEM: Chronic | ICD-10-CM

## 2021-01-29 NOTE — H&P PST ADULT - RESPIRATORY
Office Visit Chart Prep  Tejas Sykes is scheduled to see Oleg Sparks MD on 3/23/2021.    The primary care provider/referring provider is Rhoda Byers MD and the patient is being seen for g-tube  The last appointment was 12/1/20 with Dr Sparks at [facility] and the recommendations were:    Recommendations  Patient Instructions   Recommendations:  Okay to continue to use Zofran (2mg) as needed.  Continue with same tube feedings.  Current tube size is still appropriate.  Continue with 1/2 cap Miralax daily.  Okay to use Bisacodyl as needed.     Follow up in GI clinic in 3 months.           Does this encounter need to be followed up on? no      
Breath Sounds equal & clear to percussion & auscultation, no accessory muscle use

## 2021-07-20 ENCOUNTER — APPOINTMENT (OUTPATIENT)
Dept: PLASTIC SURGERY | Facility: CLINIC | Age: 56
End: 2021-07-20
Payer: OTHER MISCELLANEOUS

## 2021-08-02 ENCOUNTER — APPOINTMENT (OUTPATIENT)
Dept: PLASTIC SURGERY | Facility: CLINIC | Age: 56
End: 2021-08-02
Payer: OTHER MISCELLANEOUS

## 2021-08-02 PROCEDURE — 99212 OFFICE O/P EST SF 10 MIN: CPT

## 2021-08-02 PROCEDURE — 99072 ADDL SUPL MATRL&STAF TM PHE: CPT

## 2021-08-02 NOTE — PHYSICAL EXAM
[de-identified] : well-appearing, NAD [de-identified] : BL hands with FROM, b/l elbow incisions healing appropriately, scars soft and flat\par BL thumbs + grind test

## 2021-08-02 NOTE — ASSESSMENT
[FreeTextEntry1] : 54 y/o M with BL hand pain c/w cubital tunnel syndrome and BJA s/p kenalog x4 to each joint and thumb spica.\par Now 11 months s/p BL release of cubital tunnel. Doing well. \par \par -Continue soft thumb spica splints\par -Resume OT with Bhavik until MMI\par -F/u at earliest convenience with Dr. Farah for basal joint injections, possible discussion of surgical options as he has already had 4 injections over the past 2 years\par \par Due to COVID-19, pre-visit patient instructions were explained to the patient and their symptoms were checked upon arrival. Masks were used by the healthcare provider and staff and the examination room was cleaned after the patient visit concluded\par

## 2021-08-04 ENCOUNTER — APPOINTMENT (OUTPATIENT)
Age: 56
End: 2021-08-04
Payer: COMMERCIAL

## 2021-08-04 VITALS
SYSTOLIC BLOOD PRESSURE: 144 MMHG | OXYGEN SATURATION: 97 % | BODY MASS INDEX: 28.34 KG/M2 | RESPIRATION RATE: 14 BRPM | HEART RATE: 71 BPM | WEIGHT: 187 LBS | DIASTOLIC BLOOD PRESSURE: 82 MMHG | HEIGHT: 68 IN

## 2021-08-04 DIAGNOSIS — Z83.3 FAMILY HISTORY OF DIABETES MELLITUS: ICD-10-CM

## 2021-08-04 DIAGNOSIS — Z86.39 PERSONAL HISTORY OF OTHER ENDOCRINE, NUTRITIONAL AND METABOLIC DISEASE: ICD-10-CM

## 2021-08-04 DIAGNOSIS — Z87.09 PERSONAL HISTORY OF OTHER DISEASES OF THE RESPIRATORY SYSTEM: ICD-10-CM

## 2021-08-04 DIAGNOSIS — Z82.49 FAMILY HISTORY OF ISCHEMIC HEART DISEASE AND OTHER DISEASES OF THE CIRCULATORY SYSTEM: ICD-10-CM

## 2021-08-04 DIAGNOSIS — Z88.9 ALLERGY STATUS TO UNSPECIFIED DRUGS, MEDICAMENTS AND BIOLOGICAL SUBSTANCES: ICD-10-CM

## 2021-08-04 DIAGNOSIS — Z86.79 PERSONAL HISTORY OF OTHER DISEASES OF THE CIRCULATORY SYSTEM: ICD-10-CM

## 2021-08-04 DIAGNOSIS — R09.82 POSTNASAL DRIP: ICD-10-CM

## 2021-08-04 PROCEDURE — 99203 OFFICE O/P NEW LOW 30 MIN: CPT

## 2021-08-04 NOTE — HISTORY OF PRESENT ILLNESS
[Shortness of Breath] : Shortness of Breath [Dyspnea] : dyspnea [Weakness] : no weakness [Cough] : cough [Wheezing] : no wheezing [Fever] : no fever [Weight Gain] : weight gain [Leg Pain] : no leg pain [Edema] : no edema [TextBox_4] : SOB AT REST, LASTING 10 MIN, FEELS LIKE YAWNING, NO SOB ON EXERTION\par REMOTE HX OF SMOKING\par REPORTS ALSO PND\par HICCUPS

## 2021-08-04 NOTE — DISCUSSION/SUMMARY
[FreeTextEntry1] : SOB AT REST . NOT ON EXERTION???\par REMOTE HX OF ASTHMA\par PND\par NEEDS PFT\par ORDER NASAL SPRAYS/ VENTOLIN

## 2021-08-25 ENCOUNTER — NON-APPOINTMENT (OUTPATIENT)
Age: 56
End: 2021-08-25

## 2021-08-26 ENCOUNTER — APPOINTMENT (OUTPATIENT)
Dept: PLASTIC SURGERY | Facility: CLINIC | Age: 56
End: 2021-08-26
Payer: OTHER MISCELLANEOUS

## 2021-08-26 PROCEDURE — 99072 ADDL SUPL MATRL&STAF TM PHE: CPT

## 2021-08-26 PROCEDURE — 99212 OFFICE O/P EST SF 10 MIN: CPT

## 2021-08-26 NOTE — PHYSICAL EXAM
[de-identified] : well-appearing, NAD [de-identified] : BL hands with FROM, b/l elbow incisions healing appropriately, scars soft and flat

## 2021-08-26 NOTE — ASSESSMENT
[FreeTextEntry1] : 56 y/o M with BL hand pain c/w cubital tunnel syndrome and BJA s/p kenalog x2 to each joint and thumb spica.\par Now 2 months s/p BL release of cubital tunnel. Doing well. \par \par Due to COVID 19, pre-visit patient instructions were explained to the patient and their symptoms were checked upon arrival.  \par Masks were used by the health care providers and staff and the examination room was cleaned after the patient visit was completed.\par \par doing very well s/p cubital tunnel syndrome approx 7 wks ago--doing very well\par \par now w return of B/L BJA\par \par injected 5mg kenalog into B/L BJ\par lawanda well\par soft thumb spica splints\par \par \par doing Hand OPT w Bhavik for B/L cubtial tunnel\par \par numbeness and hand f(x) improving dramatically\par no issues\par \par f/u 6 months or prn\par \par Due to COVID 19, pre-visit patient instructions were explained to the patient and their symptoms were checked upon arrival.  \par Masks were used by the health care providers and staff and the examination room was cleaned after the patient visit was completed.\par \par \par 8/26/2021\par as above\par has return of bilateral BJA pain--L>R\par \par already has had 4 steroid injections oevr past two years\par \par suggest medrol dose pack\par B/L hand xrays\par \par pt will call after hand xrays for definitive plan\par \par discussed possible fat grafting\par \par plan after xrays\par \par Due to COVID 19, pre-visit patient instructions were explained to the patient and their symptoms were checked upon arrival.  \par Masks were used by the health care providers and staff and the examination room was cleaned after the patient visit was completed.\par \par

## 2021-08-26 NOTE — DATA REVIEWED
Progress Note    Red Rule Applied    Service Provided:  I met with Erika today for 60 minutes for supportive individual therapy.     Relevant History and Session Note:  Erika reported  Her treatment goals:  She reported wanted to know how to deal with anxiety of feeling overwhelmed with work.  Patient reported that her supervisor is not longer working there and a senior  just quit last week. Patient reported that her anxiety starts as soon as she starts her day and she has difficulties with her sleep and handling her anxiety through the day.  Assisted patient to identify how to create a daily routine for the morning in which she can have more predictability.  Patient wants to start exercising in the morning, so include that as a new habit in her daily routine.  Practice with her including music, and breathing exercises to practice reduction of anxiety.  Discuss about postponing her anxiety so she does not focus on anticipated anxiety before getting to work.  Practice with patient mindfulness activities, so she focuses on the present.  Discuss with patient about identifying how her body reacts when she feels anxiety and when she feels overwhelmed.  Practice body related exercises to reduce anxiety.  Patient indicated that she has set her mind that until November she will endure situation and then she will re evaluate if she stays or not at her job.  Provided support and encouragement.  Discuss also strategies when she has panic attacks in the middle of the night, practice grounding and self talk.  Wrote scripts she can use to practice muscle relaxation.      Progress relevant to last session: slight improvement    Interventions:   Therapist took person-centered approach, engaged in empathetic listening and promoting positive self-regard as it relates to patient's presenting problem of anxiety and some depressed mood    Assessment:  Erika presented today as:   Behavior: cooperative  Eye Contact:  [FreeTextEntry1] : EMG 2/12/20- bilateral cubital fossa syndrome  intact  Speech: fluent  Attention:  attentive  Gait, movement and Motor Coordination: Within normal limits  Alert and Oriented: Yes, to Person, Place, and Time  Mood/Affect: anxious  Thought Process: normal  Cognitive Performance:  normal  Insight and Judgment: good  Self-Harm: denies  Current Suicidal Ideation: Suicidal ideation, plan, or intent reported? denies  Homicidal Ideation: Homicidal ideation, plan, or intent reported?  denies    Diagnosis:  Axis I: Anxiety state, adjustment disorder with depressed mood    Treatment Plan:   Goal #1: Improve ways to handle her anxiety  Treatment plan:  Goal #1: decrease anxiety  Therapeutic Intervention   Practice relaxation techniques and body self regulation  Therapeutic Intervention  Use interpersonal therapy to teach Erika  communication skills, conflict resolution skills, provide modeling and use role playing to build Erika    skills.  Therapeutic Intervention   Use CBT to identify irrational self talk and help Erika  replace distorted messages with positive, rational self talk.  Goal #2:Assist patient to deal with the sadness of dealing with conflicts with   Treatment plan:  Goal #1:Therapeutic Intervention  Therapist will use psycho education to teach and model problem solving skills such as defining the problem, generating possible solutions, evaluating the solutions, selecting a plan of action, and reevaluating the solution/plan.    Therapeutic Intervention   Therapist will use psycho education to teach assertiveness skills, help Erika  develop an exercise plan, and increase social involvement to increase the likelihood that Erika can improve experience of pleasure.  Therapeutic Intervention  Therapist will use CBT to help Erika learn the connection between thoughts, depressive feelings and behaviors.        Recommendations/Plan:  1. I will continue to follow Erika regularly to provide training in development of better coping strategies in relation to  her issues of anxiety and depressed mood    2. Follow up in 2 week(s).

## 2021-09-08 ENCOUNTER — APPOINTMENT (OUTPATIENT)
Dept: CARDIOLOGY | Facility: CLINIC | Age: 56
End: 2021-09-08
Payer: COMMERCIAL

## 2021-09-08 VITALS
WEIGHT: 185 LBS | HEIGHT: 68 IN | DIASTOLIC BLOOD PRESSURE: 70 MMHG | HEART RATE: 56 BPM | SYSTOLIC BLOOD PRESSURE: 130 MMHG | BODY MASS INDEX: 28.04 KG/M2 | TEMPERATURE: 97 F

## 2021-09-08 PROCEDURE — 93000 ELECTROCARDIOGRAM COMPLETE: CPT

## 2021-09-08 PROCEDURE — 99214 OFFICE O/P EST MOD 30 MIN: CPT

## 2021-09-08 RX ORDER — ATORVASTATIN CALCIUM 80 MG/1
80 TABLET, FILM COATED ORAL
Refills: 0 | Status: ACTIVE | COMMUNITY

## 2021-09-08 RX ORDER — MONTELUKAST 10 MG/1
10 TABLET, FILM COATED ORAL
Refills: 0 | Status: ACTIVE | COMMUNITY

## 2021-09-08 NOTE — HISTORY OF PRESENT ILLNESS
[FreeTextEntry1] : negative cv history\par risk factor for cad is hypertension\par denies typical angina\par no symptoms of chf\par no valvular disease\par c/o palpitation, dizziness and vertigo\par pcp recommended cv evaluation\par \par stress test was negative\par echo was normal\par no evidence of significant heart disease\par \par patient now has labile hypertension with systolic of 170 \par and diastolic to 90

## 2021-09-08 NOTE — PHYSICAL EXAM
[General Appearance - Well Developed] : well developed [Normal Appearance] : normal appearance [Well Groomed] : well groomed [General Appearance - Well Nourished] : well nourished [No Deformities] : no deformities [General Appearance - In No Acute Distress] : no acute distress [Normal Conjunctiva] : the conjunctiva exhibited no abnormalities [Eyelids - No Xanthelasma] : the eyelids demonstrated no xanthelasmas [Normal Oral Mucosa] : normal oral mucosa [No Oral Pallor] : no oral pallor [No Oral Cyanosis] : no oral cyanosis [Normal Jugular Venous A Waves Present] : normal jugular venous A waves present [Normal Jugular Venous V Waves Present] : normal jugular venous V waves present [No Jugular Venous Huffman A Waves] : no jugular venous huffman A waves [Respiration, Rhythm And Depth] : normal respiratory rhythm and effort [Exaggerated Use Of Accessory Muscles For Inspiration] : no accessory muscle use [Auscultation Breath Sounds / Voice Sounds] : lungs were clear to auscultation bilaterally [Heart Rate And Rhythm] : heart rate and rhythm were normal [Heart Sounds] : normal S1 and S2 [Murmurs] : no murmurs present [Abdomen Soft] : soft [Abdomen Tenderness] : non-tender [Abdomen Mass (___ Cm)] : no abdominal mass palpated [Nail Clubbing] : no clubbing of the fingernails [Cyanosis, Localized] : no localized cyanosis [Petechial Hemorrhages (___cm)] : no petechial hemorrhages [Skin Color & Pigmentation] : normal skin color and pigmentation [] : no rash [No Venous Stasis] : no venous stasis [Skin Lesions] : no skin lesions [No Skin Ulcers] : no skin ulcer [No Xanthoma] : no  xanthoma was observed [Oriented To Time, Place, And Person] : oriented to person, place, and time [Affect] : the affect was normal [Mood] : the mood was normal [No Anxiety] : not feeling anxious

## 2021-09-15 ENCOUNTER — APPOINTMENT (OUTPATIENT)
Dept: SURGERY | Facility: CLINIC | Age: 56
End: 2021-09-15
Payer: COMMERCIAL

## 2021-09-15 ENCOUNTER — TRANSCRIPTION ENCOUNTER (OUTPATIENT)
Age: 56
End: 2021-09-15

## 2021-09-15 VITALS
DIASTOLIC BLOOD PRESSURE: 82 MMHG | BODY MASS INDEX: 28.34 KG/M2 | HEART RATE: 68 BPM | HEIGHT: 68 IN | TEMPERATURE: 97.1 F | SYSTOLIC BLOOD PRESSURE: 132 MMHG | WEIGHT: 187 LBS

## 2021-09-15 PROCEDURE — 99203 OFFICE O/P NEW LOW 30 MIN: CPT | Mod: 25

## 2021-09-15 PROCEDURE — 46600 DIAGNOSTIC ANOSCOPY SPX: CPT

## 2021-09-17 NOTE — HISTORY OF PRESENT ILLNESS
[FreeTextEntry1] : 56M with PMH of HTN, HLD, asthma, GERD who presents with anal itching burning and bleeding.  Patient states it has been present for years and has been worse recently.  He has 2-3 BM daily.  He has been placing topical treatments and reports mild improvement with steroids.  Notices blood only on toilet tissue. Patient denies fevers, chills, nausea, vomiting, abdominal pain, constipation, diarrhea, blood in his stool or unexpected weight loss.  Patient denies a family history of colon cancer rectal cancer or inflammatory bowel disease.  Patient's last colonoscopy was in 5/2019 and showed diverticulosis.

## 2021-09-17 NOTE — CONSULT LETTER
[Dear  ___] : Dear  [unfilled], [Consult Letter:] : I had the pleasure of evaluating your patient, [unfilled]. [Please see my note below.] : Please see my note below. [Consult Closing:] : Thank you very much for allowing me to participate in the care of this patient.  If you have any questions, please do not hesitate to contact me. [FreeTextEntry3] : Sincerely,\par \par Raoul Augustin MD, Colon and Rectal Surgery\par \par [employer] [facility]\par [address] [place] [address]\par [facility], 3rd Floor\par [place], [place] [address]\par Tel [phone] ext 2\par Fax [phone]\par

## 2021-09-17 NOTE — ASSESSMENT
[FreeTextEntry1] : 56M with with pruritus ani\par \par I discussed the pathology of pruritus ani with the patient.  Due to the patients bowel habits and cleaning habits there are multiple small excoriations of the perianal skin. I recommended cleaning with only water and a damp cloth, no soap or other topical agents on the area and calmoseptine as a barrier cream.  We will reassess the patient in 2 month.\par

## 2021-09-17 NOTE — REASON FOR VISIT
[Initial Evaluation] : an initial evaluation of [FreeTextEntry2] : palps, vertigo and chest pain [Follow-Up: _____] : a [unfilled] follow-up visit

## 2021-09-17 NOTE — PHYSICAL EXAM
[Well Developed] : well developed [Well Nourished] : well nourished [No Acute Distress] : no acute distress [Normal Venous Pressure] : normal venous pressure [No Carotid Bruit] : no carotid bruit [Normal S1, S2] : normal S1, S2 [No Murmur] : no murmur [No Rub] : no rub [No Gallop] : no gallop [Clear Lung Fields] : clear lung fields [Good Air Entry] : good air entry [No Respiratory Distress] : no respiratory distress  [Soft] : abdomen soft [Non Tender] : non-tender [No Masses/organomegaly] : no masses/organomegaly [Normal Bowel Sounds] : normal bowel sounds [Normal Gait] : normal gait [No Edema] : no edema [No Cyanosis] : no cyanosis [No Clubbing] : no clubbing [No Varicosities] : no varicosities [No Rash] : no rash [No Skin Lesions] : no skin lesions [Moves all extremities] : moves all extremities [No Focal Deficits] : no focal deficits [Normal Speech] : normal speech [Alert and Oriented] : alert and oriented [Normal memory] : normal memory [General Appearance - Well Developed] : well developed [Normal Appearance] : normal appearance [Well Groomed] : well groomed [General Appearance - Well Nourished] : well nourished [No Deformities] : no deformities [General Appearance - In No Acute Distress] : no acute distress [Normal Conjunctiva] : the conjunctiva exhibited no abnormalities [Eyelids - No Xanthelasma] : the eyelids demonstrated no xanthelasmas [Normal Oral Mucosa] : normal oral mucosa [No Oral Pallor] : no oral pallor [No Oral Cyanosis] : no oral cyanosis [Normal Jugular Venous A Waves Present] : normal jugular venous A waves present [Normal Jugular Venous V Waves Present] : normal jugular venous V waves present [No Jugular Venous Huffman A Waves] : no jugular venous huffman A waves [Respiration, Rhythm And Depth] : normal respiratory rhythm and effort [Exaggerated Use Of Accessory Muscles For Inspiration] : no accessory muscle use [Auscultation Breath Sounds / Voice Sounds] : lungs were clear to auscultation bilaterally [Heart Rate And Rhythm] : heart rate and rhythm were normal [Heart Sounds] : normal S1 and S2 [Murmurs] : no murmurs present [Abdomen Soft] : soft [Abdomen Tenderness] : non-tender [Abdomen Mass (___ Cm)] : no abdominal mass palpated [Nail Clubbing] : no clubbing of the fingernails [Cyanosis, Localized] : no localized cyanosis [Petechial Hemorrhages (___cm)] : no petechial hemorrhages [Skin Color & Pigmentation] : normal skin color and pigmentation [] : no rash [No Venous Stasis] : no venous stasis [Skin Lesions] : no skin lesions [No Skin Ulcers] : no skin ulcer [No Xanthoma] : no  xanthoma was observed [Oriented To Time, Place, And Person] : oriented to person, place, and time [Affect] : the affect was normal [No Anxiety] : not feeling anxious [Mood] : the mood was normal [Abdomen Masses] : No abdominal masses [Abdomen Tenderness] : ~T No ~M abdominal tenderness [No HSM] : no hepatosplenomegaly [Excoriation] : excoriations [Fistula] : no fistulas [Wart] : no warts [Ulcer ___ cm] : no ulcers [Pilonidal Cyst] : no pilonidal cysts [Tender, Swollen] : nontender, non-swollen [Thrombosed] : that was not thrombosed [Skin Tags] : residual hemorrhoidal skin tags were noted [Normal] : was normal [None] : there was no rectal mass  [Respiratory Effort] : normal respiratory effort [Normal Rate and Rhythm] : normal rate and rhythm [de-identified] : external examination shows excoriations of the perianal skin [de-identified] : awake, alert and in no acute distress Statement Selected

## 2021-09-17 NOTE — PHYSICAL EXAM
[Well Developed] : well developed [Well Nourished] : well nourished [No Acute Distress] : no acute distress [Normal Venous Pressure] : normal venous pressure [No Carotid Bruit] : no carotid bruit [Normal S1, S2] : normal S1, S2 [No Murmur] : no murmur [No Rub] : no rub [No Gallop] : no gallop [Clear Lung Fields] : clear lung fields [Good Air Entry] : good air entry [No Respiratory Distress] : no respiratory distress  [Soft] : abdomen soft [Non Tender] : non-tender [No Masses/organomegaly] : no masses/organomegaly [Normal Bowel Sounds] : normal bowel sounds [Normal Gait] : normal gait [No Edema] : no edema [No Cyanosis] : no cyanosis [No Clubbing] : no clubbing [No Varicosities] : no varicosities [No Rash] : no rash [No Skin Lesions] : no skin lesions [Moves all extremities] : moves all extremities [No Focal Deficits] : no focal deficits [Normal Speech] : normal speech [Alert and Oriented] : alert and oriented [Normal memory] : normal memory [General Appearance - Well Developed] : well developed [Normal Appearance] : normal appearance [Well Groomed] : well groomed [General Appearance - Well Nourished] : well nourished [No Deformities] : no deformities [General Appearance - In No Acute Distress] : no acute distress [Normal Conjunctiva] : the conjunctiva exhibited no abnormalities [Eyelids - No Xanthelasma] : the eyelids demonstrated no xanthelasmas [Normal Oral Mucosa] : normal oral mucosa [No Oral Pallor] : no oral pallor [No Oral Cyanosis] : no oral cyanosis [Normal Jugular Venous A Waves Present] : normal jugular venous A waves present [Normal Jugular Venous V Waves Present] : normal jugular venous V waves present [No Jugular Venous Huffman A Waves] : no jugular venous huffman A waves [Respiration, Rhythm And Depth] : normal respiratory rhythm and effort [Exaggerated Use Of Accessory Muscles For Inspiration] : no accessory muscle use [Auscultation Breath Sounds / Voice Sounds] : lungs were clear to auscultation bilaterally [Heart Rate And Rhythm] : heart rate and rhythm were normal [Heart Sounds] : normal S1 and S2 [Murmurs] : no murmurs present [Abdomen Soft] : soft [Abdomen Tenderness] : non-tender [Abdomen Mass (___ Cm)] : no abdominal mass palpated [Nail Clubbing] : no clubbing of the fingernails [Cyanosis, Localized] : no localized cyanosis [Petechial Hemorrhages (___cm)] : no petechial hemorrhages [Skin Color & Pigmentation] : normal skin color and pigmentation [] : no rash [No Venous Stasis] : no venous stasis [Skin Lesions] : no skin lesions [No Skin Ulcers] : no skin ulcer [No Xanthoma] : no  xanthoma was observed [Affect] : the affect was normal [Oriented To Time, Place, And Person] : oriented to person, place, and time [Mood] : the mood was normal [No Anxiety] : not feeling anxious [Abdomen Masses] : No abdominal masses [Abdomen Tenderness] : ~T No ~M abdominal tenderness [No HSM] : no hepatosplenomegaly [Excoriation] : excoriations [Fistula] : no fistulas [Wart] : no warts [Ulcer ___ cm] : no ulcers [Pilonidal Cyst] : no pilonidal cysts [Tender, Swollen] : nontender, non-swollen [Thrombosed] : that was not thrombosed [Skin Tags] : residual hemorrhoidal skin tags were noted [Normal] : was normal [None] : there was no rectal mass  [Respiratory Effort] : normal respiratory effort [Normal Rate and Rhythm] : normal rate and rhythm [de-identified] : external examination shows excoriations of the perianal skin [de-identified] : awake, alert and in no acute distress

## 2021-09-17 NOTE — CONSULT LETTER
[Dear  ___] : Dear  [unfilled], [Consult Letter:] : I had the pleasure of evaluating your patient, [unfilled]. [Please see my note below.] : Please see my note below. [Consult Closing:] : Thank you very much for allowing me to participate in the care of this patient.  If you have any questions, please do not hesitate to contact me. [FreeTextEntry3] : Sincerely,\par \par Raoul Augustin MD, Colon and Rectal Surgery\par \par [employer] at [facility]\par [address] [place] [address]\par [facility], 3rd Floor\par [place], [place] [address]\par Tel [phone] ext [phone]\par Fax [phone]\par

## 2021-09-17 NOTE — CONSULT LETTER
[Dear  ___] : Dear  [unfilled], [Consult Letter:] : I had the pleasure of evaluating your patient, [unfilled]. [Please see my note below.] : Please see my note below. [Consult Closing:] : Thank you very much for allowing me to participate in the care of this patient.  If you have any questions, please do not hesitate to contact me. [FreeTextEntry3] : Sincerely,\par \par Raoul Augustin MD, Colon and Rectal Surgery\par \par [employer] at [facility]\par [address] [place] [address]\par [facility], 3rd Floor\par [place], [place] [address]\par Tel [phone] ext 2\par Fax [phone]\par

## 2021-09-17 NOTE — PHYSICAL EXAM
[Well Developed] : well developed [Well Nourished] : well nourished [No Acute Distress] : no acute distress [Normal Venous Pressure] : normal venous pressure [No Carotid Bruit] : no carotid bruit [Normal S1, S2] : normal S1, S2 [No Murmur] : no murmur [No Rub] : no rub [No Gallop] : no gallop [Clear Lung Fields] : clear lung fields [Good Air Entry] : good air entry [No Respiratory Distress] : no respiratory distress  [Soft] : abdomen soft [Non Tender] : non-tender [No Masses/organomegaly] : no masses/organomegaly [Normal Bowel Sounds] : normal bowel sounds [Normal Gait] : normal gait [No Edema] : no edema [No Cyanosis] : no cyanosis [No Clubbing] : no clubbing [No Varicosities] : no varicosities [No Rash] : no rash [No Skin Lesions] : no skin lesions [Moves all extremities] : moves all extremities [No Focal Deficits] : no focal deficits [Normal Speech] : normal speech [Alert and Oriented] : alert and oriented [Normal memory] : normal memory [General Appearance - Well Developed] : well developed [Normal Appearance] : normal appearance [Well Groomed] : well groomed [General Appearance - Well Nourished] : well nourished [No Deformities] : no deformities [General Appearance - In No Acute Distress] : no acute distress [Normal Conjunctiva] : the conjunctiva exhibited no abnormalities [Eyelids - No Xanthelasma] : the eyelids demonstrated no xanthelasmas [Normal Oral Mucosa] : normal oral mucosa [No Oral Pallor] : no oral pallor [No Oral Cyanosis] : no oral cyanosis [Normal Jugular Venous A Waves Present] : normal jugular venous A waves present [Normal Jugular Venous V Waves Present] : normal jugular venous V waves present [No Jugular Venous Huffman A Waves] : no jugular venous huffman A waves [Respiration, Rhythm And Depth] : normal respiratory rhythm and effort [Auscultation Breath Sounds / Voice Sounds] : lungs were clear to auscultation bilaterally [Exaggerated Use Of Accessory Muscles For Inspiration] : no accessory muscle use [Heart Rate And Rhythm] : heart rate and rhythm were normal [Heart Sounds] : normal S1 and S2 [Murmurs] : no murmurs present [Abdomen Soft] : soft [Abdomen Tenderness] : non-tender [Abdomen Mass (___ Cm)] : no abdominal mass palpated [Nail Clubbing] : no clubbing of the fingernails [Cyanosis, Localized] : no localized cyanosis [Petechial Hemorrhages (___cm)] : no petechial hemorrhages [Skin Color & Pigmentation] : normal skin color and pigmentation [] : no rash [No Venous Stasis] : no venous stasis [Skin Lesions] : no skin lesions [No Skin Ulcers] : no skin ulcer [No Xanthoma] : no  xanthoma was observed [Oriented To Time, Place, And Person] : oriented to person, place, and time [Affect] : the affect was normal [No Anxiety] : not feeling anxious [Mood] : the mood was normal [Abdomen Masses] : No abdominal masses [Abdomen Tenderness] : ~T No ~M abdominal tenderness [No HSM] : no hepatosplenomegaly [Excoriation] : excoriations [Fistula] : no fistulas [Wart] : no warts [Ulcer ___ cm] : no ulcers [Pilonidal Cyst] : no pilonidal cysts [Tender, Swollen] : nontender, non-swollen [Thrombosed] : that was not thrombosed [Skin Tags] : residual hemorrhoidal skin tags were noted [Normal] : was normal [None] : there was no rectal mass  [Respiratory Effort] : normal respiratory effort [Normal Rate and Rhythm] : normal rate and rhythm [de-identified] : external examination shows excoriations of the perianal skin [de-identified] : awake, alert and in no acute distress

## 2021-10-07 ENCOUNTER — APPOINTMENT (OUTPATIENT)
Dept: CARDIOLOGY | Facility: CLINIC | Age: 56
End: 2021-10-07
Payer: COMMERCIAL

## 2021-10-07 PROCEDURE — 93306 TTE W/DOPPLER COMPLETE: CPT

## 2021-10-12 ENCOUNTER — APPOINTMENT (OUTPATIENT)
Dept: CARDIOLOGY | Facility: CLINIC | Age: 56
End: 2021-10-12
Payer: COMMERCIAL

## 2021-10-12 PROCEDURE — 93015 CV STRESS TEST SUPVJ I&R: CPT

## 2021-10-27 ENCOUNTER — APPOINTMENT (OUTPATIENT)
Dept: CARDIOLOGY | Facility: CLINIC | Age: 56
End: 2021-10-27
Payer: COMMERCIAL

## 2021-10-27 VITALS
TEMPERATURE: 97.3 F | HEART RATE: 58 BPM | BODY MASS INDEX: 28.04 KG/M2 | SYSTOLIC BLOOD PRESSURE: 140 MMHG | WEIGHT: 185 LBS | HEIGHT: 68 IN | DIASTOLIC BLOOD PRESSURE: 84 MMHG

## 2021-10-27 PROCEDURE — 99214 OFFICE O/P EST MOD 30 MIN: CPT

## 2021-10-27 PROCEDURE — 93000 ELECTROCARDIOGRAM COMPLETE: CPT

## 2021-10-27 NOTE — HISTORY OF PRESENT ILLNESS
[FreeTextEntry1] : negative cv history\par risk factor for cad is hypertension\par denies typical angina\par no symptoms of chf\par no valvular disease\par c/o palpitation, dizziness and vertigo\par pcp recxmended cv evaluation\par \par stress test was equivocal for ischemia with ekg changes of ischemia, but no chest pain on the machhine\par echo was normal\par will need nst

## 2021-10-27 NOTE — PHYSICAL EXAM
[Well Developed] : well developed [Well Nourished] : well nourished [No Acute Distress] : no acute distress [Normal Venous Pressure] : normal venous pressure [No Carotid Bruit] : no carotid bruit [Normal S1, S2] : normal S1, S2 [No Murmur] : no murmur [No Rub] : no rub [No Gallop] : no gallop [Clear Lung Fields] : clear lung fields [Good Air Entry] : good air entry [No Respiratory Distress] : no respiratory distress  [Soft] : abdomen soft [Non Tender] : non-tender [No Masses/organomegaly] : no masses/organomegaly [Normal Bowel Sounds] : normal bowel sounds [Normal Gait] : normal gait [No Edema] : no edema [No Cyanosis] : no cyanosis [No Clubbing] : no clubbing [No Varicosities] : no varicosities [No Rash] : no rash [No Skin Lesions] : no skin lesions [Moves all extremities] : moves all extremities [No Focal Deficits] : no focal deficits [Normal Speech] : normal speech [Alert and Oriented] : alert and oriented [Normal memory] : normal memory [General Appearance - Well Developed] : well developed [Normal Appearance] : normal appearance [Well Groomed] : well groomed [General Appearance - Well Nourished] : well nourished [No Deformities] : no deformities [General Appearance - In No Acute Distress] : no acute distress [Normal Conjunctiva] : the conjunctiva exhibited no abnormalities [Eyelids - No Xanthelasma] : the eyelids demonstrated no xanthelasmas [Normal Oral Mucosa] : normal oral mucosa [No Oral Pallor] : no oral pallor [No Oral Cyanosis] : no oral cyanosis [Normal Jugular Venous A Waves Present] : normal jugular venous A waves present [Normal Jugular Venous V Waves Present] : normal jugular venous V waves present [No Jugular Venous Huffman A Waves] : no jugular venous huffman A waves [Respiration, Rhythm And Depth] : normal respiratory rhythm and effort [Exaggerated Use Of Accessory Muscles For Inspiration] : no accessory muscle use [Auscultation Breath Sounds / Voice Sounds] : lungs were clear to auscultation bilaterally [Heart Rate And Rhythm] : heart rate and rhythm were normal [Heart Sounds] : normal S1 and S2 [Murmurs] : no murmurs present [Abdomen Soft] : soft [Abdomen Tenderness] : non-tender [Abdomen Mass (___ Cm)] : no abdominal mass palpated [Nail Clubbing] : no clubbing of the fingernails [Cyanosis, Localized] : no localized cyanosis [Petechial Hemorrhages (___cm)] : no petechial hemorrhages [Skin Color & Pigmentation] : normal skin color and pigmentation [] : no rash [No Venous Stasis] : no venous stasis [Skin Lesions] : no skin lesions [No Skin Ulcers] : no skin ulcer [No Xanthoma] : no  xanthoma was observed [Oriented To Time, Place, And Person] : oriented to person, place, and time [Affect] : the affect was normal [Mood] : the mood was normal [No Anxiety] : not feeling anxious

## 2021-11-03 ENCOUNTER — APPOINTMENT (OUTPATIENT)
Dept: SURGERY | Facility: CLINIC | Age: 56
End: 2021-11-03
Payer: COMMERCIAL

## 2021-11-03 VITALS
WEIGHT: 180 LBS | BODY MASS INDEX: 27.28 KG/M2 | SYSTOLIC BLOOD PRESSURE: 132 MMHG | DIASTOLIC BLOOD PRESSURE: 96 MMHG | TEMPERATURE: 97.3 F | HEART RATE: 73 BPM | HEIGHT: 68 IN

## 2021-11-03 PROCEDURE — 99213 OFFICE O/P EST LOW 20 MIN: CPT

## 2021-11-03 NOTE — CONSULT LETTER
[Dear  ___] : Dear  [unfilled], [Courtesy Letter:] : I had the pleasure of seeing your patient, [unfilled], in my office today. [Please see my note below.] : Please see my note below. [Consult Closing:] : Thank you very much for allowing me to participate in the care of this patient.  If you have any questions, please do not hesitate to contact me. [FreeTextEntry3] : Sincerely,\par \par Raoul Augustin MD, Colon and Rectal Surgery\par \par [employer] [facility]\par [address] [place] [address]\par [facility], 3rd Floor\par [place], [place] [address]\par Tel [phone] ext [phone]\par Fax [phone]\par

## 2021-11-03 NOTE — ASSESSMENT
[FreeTextEntry1] : 56M with with pruritus ani\par \par The patient has improved a great deal.  He will continue with the current regimen.  We will see him back in 2-3 months.

## 2021-11-03 NOTE — PHYSICAL EXAM
[Abdomen Masses] : No abdominal masses [Abdomen Tenderness] : ~T No ~M abdominal tenderness [No HSM] : no hepatosplenomegaly [Excoriation] : no perianal excoriation [Fistula] : no fistulas [Wart] : no warts [Ulcer ___ cm] : no ulcers [Pilonidal Cyst] : no pilonidal cysts [Tender, Swollen] : nontender, non-swollen [Thrombosed] : that was not thrombosed [Skin Tags] : residual hemorrhoidal skin tags were noted [Normal] : was normal [None] : there was no rectal mass  [Respiratory Effort] : normal respiratory effort [Normal Rate and Rhythm] : normal rate and rhythm [de-identified] : external examination shows resolution of the excoriations except for a small area in the left posterior location [de-identified] : awake, alert and in no acute distress

## 2021-11-03 NOTE — HISTORY OF PRESENT ILLNESS
[FreeTextEntry1] : 56M with PMH of HTN, HLD, asthma, GERD who presents for follow up of pruritus ani.  On his last visit he was found to have perianal excoriations and was started on conservative management.  Today he states he is greatly improved. He continues to have very mild and infrequent burning. He has 2-3 BM daily.   Patient denies fevers, chills, nausea, vomiting, abdominal pain, constipation, diarrhea, blood in his stool or unexpected weight loss.  Patient denies a family history of colon cancer rectal cancer or inflammatory bowel disease.  Patient's last colonoscopy was in 5/2019 and showed diverticulosis.

## 2021-12-02 ENCOUNTER — OUTPATIENT (OUTPATIENT)
Dept: OUTPATIENT SERVICES | Facility: HOSPITAL | Age: 56
LOS: 1 days | Discharge: HOME | End: 2021-12-02
Payer: COMMERCIAL

## 2021-12-02 DIAGNOSIS — Z98.89 OTHER SPECIFIED POSTPROCEDURAL STATES: Chronic | ICD-10-CM

## 2021-12-02 DIAGNOSIS — K21.9 GASTRO-ESOPHAGEAL REFLUX DISEASE WITHOUT ESOPHAGITIS: ICD-10-CM

## 2021-12-02 DIAGNOSIS — Z87.81 PERSONAL HISTORY OF (HEALED) TRAUMATIC FRACTURE: Chronic | ICD-10-CM

## 2021-12-02 DIAGNOSIS — Z87.19 PERSONAL HISTORY OF OTHER DISEASES OF THE DIGESTIVE SYSTEM: Chronic | ICD-10-CM

## 2021-12-02 PROCEDURE — 78452 HT MUSCLE IMAGE SPECT MULT: CPT | Mod: 26

## 2021-12-08 ENCOUNTER — APPOINTMENT (OUTPATIENT)
Dept: CARDIOLOGY | Facility: CLINIC | Age: 56
End: 2021-12-08
Payer: COMMERCIAL

## 2021-12-08 VITALS
BODY MASS INDEX: 28.49 KG/M2 | SYSTOLIC BLOOD PRESSURE: 110 MMHG | DIASTOLIC BLOOD PRESSURE: 80 MMHG | TEMPERATURE: 97.1 F | WEIGHT: 188 LBS | HEART RATE: 70 BPM | HEIGHT: 68 IN

## 2021-12-08 DIAGNOSIS — Z13.6 ENCOUNTER FOR SCREENING FOR CARDIOVASCULAR DISORDERS: ICD-10-CM

## 2021-12-08 DIAGNOSIS — R06.02 SHORTNESS OF BREATH: ICD-10-CM

## 2021-12-08 PROCEDURE — 93000 ELECTROCARDIOGRAM COMPLETE: CPT

## 2021-12-08 PROCEDURE — 99214 OFFICE O/P EST MOD 30 MIN: CPT

## 2021-12-08 NOTE — HISTORY OF PRESENT ILLNESS
[FreeTextEntry1] : negative cv history\par risk factor for cad is hypertension\par denies typical angina\par no symptoms of chf\par no valvular disease\par c/o palpitation, dizziness and vertigo\par pcp recxmended cv evaluation\par \par stress test was equivocal for ischemia with ekg changes of ischemia, but no chest pain on the machhine\par echo was normal\par nst was normal\par ekg is nsr

## 2021-12-11 ENCOUNTER — NON-APPOINTMENT (OUTPATIENT)
Age: 56
End: 2021-12-11

## 2021-12-14 ENCOUNTER — APPOINTMENT (OUTPATIENT)
Age: 56
End: 2021-12-14
Payer: COMMERCIAL

## 2021-12-14 VITALS
HEART RATE: 61 BPM | SYSTOLIC BLOOD PRESSURE: 130 MMHG | OXYGEN SATURATION: 97 % | HEIGHT: 68 IN | DIASTOLIC BLOOD PRESSURE: 70 MMHG | BODY MASS INDEX: 27.43 KG/M2 | WEIGHT: 181 LBS

## 2021-12-14 PROCEDURE — 99213 OFFICE O/P EST LOW 20 MIN: CPT | Mod: 25

## 2021-12-14 PROCEDURE — 94729 DIFFUSING CAPACITY: CPT

## 2021-12-14 PROCEDURE — 94010 BREATHING CAPACITY TEST: CPT

## 2021-12-14 PROCEDURE — 94727 GAS DIL/WSHOT DETER LNG VOL: CPT

## 2021-12-14 NOTE — DISCUSSION/SUMMARY
[FreeTextEntry1] : SOB AT REST . NOT ON EXERTION. PFT NL\par REMOTE HX OF ASTHMA\par PND\par NEEDS PFT\par NASAL SPRAYS/ VENTOLIN

## 2021-12-15 ENCOUNTER — APPOINTMENT (OUTPATIENT)
Dept: CARDIOLOGY | Facility: CLINIC | Age: 56
End: 2021-12-15

## 2022-01-05 ENCOUNTER — APPOINTMENT (OUTPATIENT)
Dept: CARDIOLOGY | Facility: CLINIC | Age: 57
End: 2022-01-05

## 2022-01-05 ENCOUNTER — OUTPATIENT (OUTPATIENT)
Dept: OUTPATIENT SERVICES | Facility: HOSPITAL | Age: 57
LOS: 1 days | Discharge: HOME | End: 2022-01-05
Payer: COMMERCIAL

## 2022-01-05 DIAGNOSIS — Z87.19 PERSONAL HISTORY OF OTHER DISEASES OF THE DIGESTIVE SYSTEM: Chronic | ICD-10-CM

## 2022-01-05 DIAGNOSIS — Z87.81 PERSONAL HISTORY OF (HEALED) TRAUMATIC FRACTURE: Chronic | ICD-10-CM

## 2022-01-05 DIAGNOSIS — Z98.89 OTHER SPECIFIED POSTPROCEDURAL STATES: Chronic | ICD-10-CM

## 2022-01-05 DIAGNOSIS — Z13.810 ENCOUNTER FOR SCREENING FOR UPPER GASTROINTESTINAL DISORDER: ICD-10-CM

## 2022-01-05 PROCEDURE — 74240 X-RAY XM UPR GI TRC 1CNTRST: CPT | Mod: 26

## 2022-01-12 ENCOUNTER — APPOINTMENT (OUTPATIENT)
Dept: SURGERY | Facility: CLINIC | Age: 57
End: 2022-01-12
Payer: COMMERCIAL

## 2022-01-12 VITALS
HEART RATE: 74 BPM | DIASTOLIC BLOOD PRESSURE: 85 MMHG | OXYGEN SATURATION: 97 % | WEIGHT: 18.7 LBS | TEMPERATURE: 97.6 F | BODY MASS INDEX: 2.84 KG/M2 | SYSTOLIC BLOOD PRESSURE: 141 MMHG

## 2022-01-12 DIAGNOSIS — L29.0 PRURITUS ANI: ICD-10-CM

## 2022-01-12 PROCEDURE — 99213 OFFICE O/P EST LOW 20 MIN: CPT

## 2022-01-13 PROBLEM — L29.0 PRURITUS ANI: Status: ACTIVE | Noted: 2021-09-17

## 2022-01-13 NOTE — PHYSICAL EXAM
[Abdomen Masses] : No abdominal masses [Abdomen Tenderness] : ~T No ~M abdominal tenderness [No HSM] : no hepatosplenomegaly [Excoriation] : no perianal excoriation [Fistula] : no fistulas [Wart] : no warts [Ulcer ___ cm] : no ulcers [Pilonidal Cyst] : no pilonidal cysts [Tender, Swollen] : nontender, non-swollen [Thrombosed] : that was not thrombosed [Skin Tags] : residual hemorrhoidal skin tags were noted [Normal] : was normal [None] : there was no rectal mass  [Respiratory Effort] : normal respiratory effort [Normal Rate and Rhythm] : normal rate and rhythm [de-identified] : external examination shows healing of the perianal skin [de-identified] : awake, alert and in no acute distress

## 2022-01-13 NOTE — ASSESSMENT
[FreeTextEntry1] : 56M with with pruritus ani\par \par The patient is doing well.  We will prescribe 1% hydrocortisone to treat the irritation.  We will see him back in 2-3 months.

## 2022-01-13 NOTE — CONSULT LETTER
[Dear  ___] : Dear  [unfilled], [Courtesy Letter:] : I had the pleasure of seeing your patient, [unfilled], in my office today. [Please see my note below.] : Please see my note below. [Consult Closing:] : Thank you very much for allowing me to participate in the care of this patient.  If you have any questions, please do not hesitate to contact me. [FreeTextEntry3] : Sincerely,\par \par Raoul Augustin MD, Colon and Rectal Surgery\par \par [employer] [facility]\par [address] [place] [address]\par [facility], 3rd Floor\par [place], [place] [address]\par Tel [phone] ext [phone]\par Fax [phone]\par

## 2022-02-17 ENCOUNTER — APPOINTMENT (OUTPATIENT)
Dept: PLASTIC SURGERY | Facility: CLINIC | Age: 57
End: 2022-02-17
Payer: OTHER MISCELLANEOUS

## 2022-02-17 PROCEDURE — 99212 OFFICE O/P EST SF 10 MIN: CPT

## 2022-02-17 PROCEDURE — 99072 ADDL SUPL MATRL&STAF TM PHE: CPT

## 2022-02-17 NOTE — ASSESSMENT
[FreeTextEntry1] : 57 y/o M with BL hand pain c/w cubital tunnel syndrome and BJA s/p kenalog x2 to each joint and thumb spica.\par Now  s/p BL release of cubital tunnel. Doing well. \par \par Due to COVID 19, pre-visit patient instructions were explained to the patient and their symptoms were checked upon arrival.  \par Masks were used by the health care providers and staff and the examination room was cleaned after the patient visit was completed.\par \par doing very well s/p cubital tunnel syndrome approx 7 wks ago--doing very well\par \par now w return of B/L BJA\par \par injected 5mg kenalog into B/L BJ\par lawanda well\par soft thumb spica splints\par \par \par doing Hand OPT w Bhavik for B/L cubtial tunnel\par \par numbeness and hand f(x) improving dramatically\par no issues\par \par f/u 6 months or prn\par \par Due to COVID 19, pre-visit patient instructions were explained to the patient and their symptoms were checked upon arrival.  \par Masks were used by the health care providers and staff and the examination room was cleaned after the patient visit was completed.\par \par \par 8/26/2021\par as above\par has return of bilateral BJA pain--L>R\par \par already has had 4 steroid injections oevr past two years\par \par suggest medrol dose pack\par B/L hand xrays\par \par pt will call after hand xrays for definitive plan\par \par discussed possible fat grafting\par \par plan after xrays\par \par Due to COVID 19, pre-visit patient instructions were explained to the patient and their symptoms were checked upon arrival.  \par Masks were used by the health care providers and staff and the examination room was cleaned after the patient visit was completed.\par \par 2/172022\par got xrays but images not available\par pt to  disc at regional and drop off in order to make plan for B/l BJA (has had multiple injections in the past)\par \par explained in detail to pt options and possibilities\par \par definitive plan pending\par \par Due to COVID 19, pre-visit patient instructions were explained to the patient and their symptoms were checked upon arrival.  \par Masks were used by the health care providers and staff and the examination room was cleaned after the patient visit was completed.\par \par \par

## 2022-02-17 NOTE — HISTORY OF PRESENT ILLNESS
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 Pt saw Dr. Hardin and was dx with BL BJA and cubital tunnel syndrome; he given BL kenalog injections and advised to wear BL thumb spicas. He states Dr. Hardin offered surgery but reports he would not be able to return to work after.\par \par Now c/o BL hand numbness and thumb pain. Here for second opinion.\par \par Quit smoking 7 years ago\par Nondiabetic\par \par Interval hx (7/16/20). Patient presents today for f/u to discuss treatment. EMG from February 2020 revealed bilateral cubital tunnel syndrome, no CTS. Reports improvement in basal joint pain after Kenalog injection last visit. \par \par Interval hx (10/5/20). Patient presents today POD#7 s/p BL cubital tunnel release. Doing well with appropriate incisional discomfort. Denies any fever, chills or bleeding. \par \par Interval hx (10/14/20): Patient presents today POD#16 s/p BL cubital tunnel release. Offers no complaints.\par \par Interval hx (11/24/20): Pt presents today 2 months s/p BL cubital tunnel release. Going to OT with improvement. Still c/o occasional BL elbow pain and electric shock sensation when touched. \par \par Interval hx (2/17/22): Pt presents today s/p BL cubital tunnel release.

## 2022-02-17 NOTE — PHYSICAL EXAM
[de-identified] : well-appearing, NAD [de-identified] : BL hands with FROM, b/l elbow incisions healing appropriately, scars soft and flat

## 2022-03-22 ENCOUNTER — APPOINTMENT (OUTPATIENT)
Dept: CARDIOTHORACIC SURGERY | Facility: CLINIC | Age: 57
End: 2022-03-22
Payer: COMMERCIAL

## 2022-03-22 VITALS
RESPIRATION RATE: 12 BRPM | SYSTOLIC BLOOD PRESSURE: 142 MMHG | OXYGEN SATURATION: 98 % | HEIGHT: 68 IN | TEMPERATURE: 98.4 F | DIASTOLIC BLOOD PRESSURE: 89 MMHG | HEART RATE: 77 BPM

## 2022-03-22 DIAGNOSIS — R06.6 HICCOUGH: ICD-10-CM

## 2022-03-22 PROCEDURE — 99203 OFFICE O/P NEW LOW 30 MIN: CPT

## 2022-03-22 NOTE — PHYSICAL EXAM
[General Appearance - Alert] : alert [General Appearance - In No Acute Distress] : in no acute distress [Sclera] : the sclera and conjunctiva were normal [PERRL With Normal Accommodation] : pupils were equal in size, round, and reactive to light [Extraocular Movements] : extraocular movements were intact [Outer Ear] : the ears and nose were normal in appearance [Neck Appearance] : the appearance of the neck was normal [Auscultation Breath Sounds / Voice Sounds] : lungs were clear to auscultation bilaterally [Heart Rate And Rhythm] : heart rate was normal and rhythm regular [Heart Sounds] : normal S1 and S2 [Heart Sounds Gallop] : no gallops [Murmurs] : no murmurs [Heart Sounds Pericardial Friction Rub] : no pericardial rub [Bowel Sounds] : normal bowel sounds [Abdomen Soft] : soft [Abdomen Tenderness] : non-tender [Abdomen Mass (___ Cm)] : no abdominal mass palpated [Skin Color & Pigmentation] : normal skin color and pigmentation [Skin Turgor] : normal skin turgor [] : no rash [Deep Tendon Reflexes (DTR)] : deep tendon reflexes were 2+ and symmetric [Sensation] : the sensory exam was normal to light touch and pinprick [No Focal Deficits] : no focal deficits [Oriented To Time, Place, And Person] : oriented to person, place, and time [Impaired Insight] : insight and judgment were intact [Affect] : the affect was normal

## 2022-03-23 NOTE — HISTORY OF PRESENT ILLNESS
[FreeTextEntry1] : Mr. DEBORAH HUNG is a 56 year M, ex smoker, that arrives today for a consultation for recurrent small hiatal hernial.  He is S/P repair of hiatal hernia repair 2016 ([facility]). His PMH includes HTN, HLD and back pain.  He had an esophagram 1/2022 which showed recurrence.  Patient was seen by their GI for complaints of hiccups. Patient states that the hiccups are random and may last from a few minutes to half and hour. Here today for surgical discussion.  \par \par ECOG 0 \par Lives with\par ex smoker- 8 cigarettes a day X 10 years on and off\par COVID History- Never\par Denies major psychiatric history\par \par Their Healthcare team is as follows:\par \par PMD: Gegcht \par Cardiologist: Jeronimo\par Pulm: Shalonda \par Gastroenterologist: Dr. Betancourt \par

## 2022-03-23 NOTE — ASSESSMENT
[FreeTextEntry1] : Mr. DEBORAH MOBLEY is a 56 year M, ex smoker, that arrives today for a consultation for recurrent small hiatal hernial.  He is S/P repair of hiatal hernia repair 2016 ([facility]). His PMH includes HTN, HLD and back pain.  He had an esophagram 1/2022 which showed recurrence.  Patient was seen by their GI for complaints of hiccups. Patient states that the hiccups are random and may last from a few minutes to half and hour. Here today for surgical discussion.  Patient has no symptoms of fullness, reflux, nausea or vomiting.  Endoscopy shows moderate hernia, and some dysplasia but no symptoms. Plan will be to follow up with GI for medical management. If symptoms worsen, call office, then plan for manometry. \par \par KISHORE Dao, [facility] am acting as the scribe for Dr. Lakhwinder Montilla\par Mr. Mobley is a 56-year-old male who presented to the office with diagnosis of recurrent hiatal hernia. Patient underwent hiatal hernia repair and fundoplication in 2016, with good outcomes: resolved heartburn and regurgitation. Patient refers worsening hiccups for the past 8-10 months. Unable to pin point triggers, presents 3-5 times a week and resolves spontaneously. No dysphagia and no heartburn. Recent EGD: moderate size hiatal hernia and distal esophagitis. Esophagram: small sliding hiatal hernia and no reflux. I reviewed the symptoms, EGD and BaSw findings and discussed redo-surgery: laparoscopic redo-hiatal hernia repair and redo-fundoplication. I explained the procedure, risks and possible complications and also need for preoperative testing. I also disclosed that hernia size seen in BaSw was not too large and that redo surgery indication would be based on severity of symptoms. Patient understood and expressed his desire to avoid surgery. Dietary recommendations given and advice regarding PPI. He is to return to office if symptoms do not improve or worsen.\par

## 2022-03-23 NOTE — DATA REVIEWED
[FreeTextEntry1] : ACC: [id number] EXAM: XR UGI SNGL CON STDY\par \par PROCEDURE DATE: 01/05/2022\par \par \par \par INTERPRETATION: Clinical History / Reason for exam: Comparison: None.\par \par Procedure: Real-time fluoroscopy was used for evaluation of the upper GI tract after administration of thin and thick barium.\par \par Findings:\par \par Swallowing is grossly normal. The esophagus shows normal contour, caliber and motility. Small paraesophageal hiatal hernia noted. The stomach and duodenum appear normal otherwise. The duodenojejunal junction is normal in position. No gastroesophageal reflux occurred during the examination.\par \par A 13 mm barium tablet traverses the esophagus with no obstruction.\par \par \par Impression:\par \par Small paraesophageal hiatal hernia.\par \par Otherwise, unremarkable upper GI series.

## 2022-04-13 ENCOUNTER — APPOINTMENT (OUTPATIENT)
Dept: SURGERY | Facility: CLINIC | Age: 57
End: 2022-04-13

## 2022-05-24 ENCOUNTER — APPOINTMENT (OUTPATIENT)
Dept: CARDIOLOGY | Facility: CLINIC | Age: 57
End: 2022-05-24
Payer: COMMERCIAL

## 2022-05-24 VITALS
BODY MASS INDEX: 28.59 KG/M2 | SYSTOLIC BLOOD PRESSURE: 118 MMHG | HEART RATE: 54 BPM | DIASTOLIC BLOOD PRESSURE: 80 MMHG | WEIGHT: 188 LBS

## 2022-05-24 PROCEDURE — 93000 ELECTROCARDIOGRAM COMPLETE: CPT

## 2022-05-24 PROCEDURE — 99214 OFFICE O/P EST MOD 30 MIN: CPT

## 2022-05-24 RX ORDER — METHYLPREDNISOLONE 4 MG/1
4 TABLET ORAL
Qty: 1 | Refills: 3 | Status: DISCONTINUED | COMMUNITY
Start: 2021-08-26 | End: 2022-05-24

## 2022-05-24 RX ORDER — LOSARTAN POTASSIUM 50 MG/1
50 TABLET, FILM COATED ORAL DAILY
Qty: 30 | Refills: 6 | Status: ACTIVE | COMMUNITY
Start: 2022-05-13

## 2022-05-24 RX ORDER — DICLOFENAC SODIUM 75 MG/1
75 TABLET, DELAYED RELEASE ORAL
Refills: 3 | Status: ACTIVE | COMMUNITY

## 2022-05-24 RX ORDER — LISINOPRIL 5 MG/1
5 TABLET ORAL DAILY
Refills: 0 | Status: DISCONTINUED | COMMUNITY
End: 2022-05-24

## 2022-05-24 RX ORDER — OMEPRAZOLE 40 MG/1
40 CAPSULE, DELAYED RELEASE ORAL
Qty: 90 | Refills: 3 | Status: ACTIVE | COMMUNITY
Start: 2021-12-16

## 2022-05-24 RX ORDER — BETAMETHASONE VALERATE 1 MG/G
0.1 CREAM TOPICAL DAILY
Refills: 0 | Status: ACTIVE | COMMUNITY
Start: 2022-03-09

## 2022-05-25 NOTE — ASU PATIENT PROFILE, ADULT - PT NEEDS ASSIST
Called and spoke with patient regarding results/recommendations as outlined per Dr. Bass.  Patient verbalized understanding.     no

## 2022-05-25 NOTE — HISTORY OF PRESENT ILLNESS
[FreeTextEntry1] : negative cv history\par risk factor for cad is hypertension\par denies typical angina\par no symptoms of chf\par no valvular disease\par c/o palpitation, dizziness and vertigo\par pcp recxmended cv evaluation\par \par stress test was equivocal for ischemia with ekg changes of ischemia, but no chest pain on the machhine\par echo was normal\par will need nst\par \par nst was done and was negative\par patient denies any new cardiac complaints since last visit\par

## 2022-06-03 NOTE — DISCUSSION/SUMMARY
6/3 avoid nephrotoxic drugs.  Non oliguric.  IV fluid.  Monitor BMP.  Monitor urine output   [FreeTextEntry1] : rv 5 mos

## 2022-06-14 ENCOUNTER — APPOINTMENT (OUTPATIENT)
Age: 57
End: 2022-06-14
Payer: COMMERCIAL

## 2022-06-14 VITALS
OXYGEN SATURATION: 98 % | HEIGHT: 68 IN | BODY MASS INDEX: 27.58 KG/M2 | DIASTOLIC BLOOD PRESSURE: 72 MMHG | WEIGHT: 182 LBS | SYSTOLIC BLOOD PRESSURE: 130 MMHG | HEART RATE: 68 BPM | RESPIRATION RATE: 12 BRPM

## 2022-06-14 DIAGNOSIS — R06.02 SHORTNESS OF BREATH: ICD-10-CM

## 2022-06-14 PROCEDURE — 99214 OFFICE O/P EST MOD 30 MIN: CPT

## 2022-06-14 NOTE — DISCUSSION/SUMMARY
[FreeTextEntry1] : SOB AT REST \par REMOTE HX OF ASTHMA\par PND\par NEEDS PFT\par NASAL SPRAYS/ VENTOLIN\par GLADIS / HST

## 2022-08-22 ENCOUNTER — APPOINTMENT (OUTPATIENT)
Dept: SLEEP CENTER | Facility: HOSPITAL | Age: 57
End: 2022-08-22

## 2022-08-22 ENCOUNTER — OUTPATIENT (OUTPATIENT)
Dept: OUTPATIENT SERVICES | Facility: HOSPITAL | Age: 57
LOS: 1 days | Discharge: HOME | End: 2022-08-22

## 2022-08-22 DIAGNOSIS — Z87.19 PERSONAL HISTORY OF OTHER DISEASES OF THE DIGESTIVE SYSTEM: Chronic | ICD-10-CM

## 2022-08-22 DIAGNOSIS — Z98.89 OTHER SPECIFIED POSTPROCEDURAL STATES: Chronic | ICD-10-CM

## 2022-08-22 DIAGNOSIS — Z87.81 PERSONAL HISTORY OF (HEALED) TRAUMATIC FRACTURE: Chronic | ICD-10-CM

## 2022-08-22 PROCEDURE — 95800 SLP STDY UNATTENDED: CPT | Mod: 26

## 2022-08-23 DIAGNOSIS — G47.33 OBSTRUCTIVE SLEEP APNEA (ADULT) (PEDIATRIC): ICD-10-CM

## 2022-08-31 DIAGNOSIS — M47.818 SPONDYLOSIS W/OUT MYELOPATHY OR RADICULOPATHY, SACRAL AND SACROCOCCYGEAL REGION: ICD-10-CM

## 2022-09-01 ENCOUNTER — RX RENEWAL (OUTPATIENT)
Age: 57
End: 2022-09-01

## 2022-09-01 RX ORDER — ALBUTEROL SULFATE 90 UG/1
108 (90 BASE) INHALANT RESPIRATORY (INHALATION) 4 TIMES DAILY
Qty: 36 | Refills: 3 | Status: ACTIVE | COMMUNITY
Start: 2021-08-04 | End: 1900-01-01

## 2022-09-27 ENCOUNTER — NON-APPOINTMENT (OUTPATIENT)
Age: 57
End: 2022-09-27

## 2022-09-28 ENCOUNTER — APPOINTMENT (OUTPATIENT)
Age: 57
End: 2022-09-28

## 2022-09-28 PROCEDURE — 99441: CPT | Mod: 95

## 2022-09-28 NOTE — REASON FOR VISIT
[Home] : at home, [unfilled] , at the time of the visit. [Medical Office: ([facility])___] : at the medical office located in  [Verbal consent obtained from patient] : the patient, [unfilled] [Follow-Up] : a follow-up visit [Asthma] : asthma [Cough] : cough

## 2022-10-05 ENCOUNTER — APPOINTMENT (OUTPATIENT)
Dept: PAIN MANAGEMENT | Facility: CLINIC | Age: 57
End: 2022-10-05

## 2022-10-20 ENCOUNTER — RX RENEWAL (OUTPATIENT)
Age: 57
End: 2022-10-20

## 2022-10-26 ENCOUNTER — APPOINTMENT (OUTPATIENT)
Dept: PAIN MANAGEMENT | Facility: CLINIC | Age: 57
End: 2022-10-26

## 2022-10-26 VITALS
WEIGHT: 182 LBS | DIASTOLIC BLOOD PRESSURE: 79 MMHG | SYSTOLIC BLOOD PRESSURE: 144 MMHG | HEIGHT: 68 IN | HEART RATE: 59 BPM | BODY MASS INDEX: 27.58 KG/M2

## 2022-10-26 PROCEDURE — 99072 ADDL SUPL MATRL&STAF TM PHE: CPT

## 2022-10-26 PROCEDURE — 99215 OFFICE O/P EST HI 40 MIN: CPT | Mod: 25

## 2022-10-26 PROCEDURE — 96136 PSYCL/NRPSYC TST PHY/QHP 1ST: CPT | Mod: 59

## 2022-10-26 NOTE — ASSESSMENT
[FreeTextEntry1] : 57 year old male presenting with a flare up of lumbar radicular pain. Patient is presenting with radicular pain with impairment in ADLs and functionality.  The pain has not responded to conservative care, including medications, stretching, as well as active modalities, such as physical therapy.  Imaging studies as well as physical exam findings corroborate the symptomatology and radicular pain.  We will proceed with an epidural at this point. I will proceed with a caudal epidural steroid injection with sedation. He will continue with Gabapentin and I will add on Baclofen 10 mg. Follow up in 6 weeks will be made for reassessment.\par \par Patient had a MRI that shows a radicular component along with pain referred into the lower extremity. Patient has trialed rehab (Home exercise, physical therapy or chiropractic care) and medications. I will schedule a caudal epidural steroid injection.\par \par Risk, benefits, pros and cons of procedure were explained to the patient using models and diagrams and their questions were answered. \par \par \par The patient has severe anxiety of procedures that necessitates monitored anesthesia care (MAC). The procedure performed will be close to major nerves, arteries, and spinal cord and/or joint structures. Due to the proximity of these structures, we need the patient to be still during the procedure.  With the help of MAC, this will be safely achieved and decrease the risk of any complications.\par \par Overall there is at least a 30-50% reduction in pain with the prescribed analgesics. The patient denies any adverse side effects due to the medication (sleeping disturbance, constipation, sleepiness, hallucinations and/or urination problems).\par \par Neuropsychological SOAPP and PCS testing was performed as an evaluation of cognition, mood, personality, behavior to assess likelihood of addiction, misuse, other aberrant medication-related behaviors, and different thoughts and feelings that may be associated with pain. The total time spent rendering and interpreting the service was approximately 20 minutes. Results will be implemented in the appropriate care of the patient\par \par Disability status:\par Temporary total disability. Patient is unable to return to work at this time.\par \par Entered by Iva Cordova, acting as scribe for Dr. Hernandez.\par  \par The documentation recorded by the scribe, in my presence, accurately reflects the service I personally performed, and the decisions made by me with my edits as appropriate.\par  \par Best Regards, \par Lincoln Hernandez MD \par Board Certified, Anesthesiology \par Board Certified, Pain Medicine\par \par

## 2022-10-26 NOTE — PHYSICAL EXAM
[de-identified] : Constitutional\par GENERAL APPEARANCE OF PATIENT IS WELL DEVELOPED, WELL NOURISHED, BODY HABITUS NORMAL, WELL GROOMED, NO DEFORMITIES NOTED. \par \par Head\par -          Atraumatic and Normocephalic \par \par Eyes, Nose, and Throat:\par -          External inspection of ears and nose are normal overall without scars, lesions, or masses noted\par -          Assessment of hearing is normal\par \par Neck\par -          Examination of neck shows no masses, overall appearance is normal, neck is symmetric, tracheal position is midline, no crepitus is noted\par -          Examination of thyroid shows no enlargement, tenderness or masses\par \par Respiratory\par -          Assessment of respiratory effort shows no intercostal retractions, no use of accessory muscles, unlabored breathing, and normal diaphragmatic movement.\par \par Cardiovascular\par -          Examination of extremities show no edema or varicosities\par \par Musculoskeletal\par -           Inspection and palpation of digits and nails shows no clubbing, cyanosis, nodules, drainage, fluctuance, petechiae\par \par 1)         Spine- Palpation of the thoracic/lumbar spine is as follows: left SI joint tenderness. Range of motion of the thoracic and lumbar spine is as follows: Diminished range of motion in all planes. stiffness at extremes extentions and stiffness with bending to the left. Special testing of the thoracic and lumbar spine is as follows: Cinthia testing is positive for reproduction of pain into the groin on the left. Cinthia testing is positive for reproduction of pain at the PSIS, and there is a postive Viridiana Finger test and a positive Gaenslen's test on the left. \par \par 2)         Neck- inspection and palpation shows no misalignment, asymmetry, crepitation, defects, tenderness, masses, effusions. ROM is normal without crepitation or contracture. No instability or subluxation or laxity is noted. No abnormal movements.\par \par 3)         RUE- inspection and palpation shows no misalignment, asymmetry, crepitation, defects, tenderness, masses, effusions. ROM is normal without crepitation or contracture. No instability or subluxation or laxity is noted. No abnormal movements.\par \par 4)         LUE-inspection and palpation shows no misalignment, asymmetry, crepitation, defects, tenderness, masses, effusions. ROM is normal without crepitation or contracture. No instability or subluxation or laxity is noted. No abnormal movements.\par \par 5)         RLE- inspection and palpation shows no misalignment, asymmetry, crepitation, defects, tenderness, masses, effusions. ROM is normal without crepitation or contracture. No instability or subluxation or laxity is noted. No abnormal movements.\par \par 6)         LLE-inspection and palpation shows no misalignment, asymmetry, crepitation, defects, tenderness, masses, effusions. ROM is normal without crepitation or contracture. No instability or subluxation or laxity is noted. No abnormal movements.\par \par Skin\par -           Inspection of skin and subcutaneous tissue shows no rashes, lesions or ulcers\par -           Palpation of skin and subcutaneous tissue shows no rashes, no indurations, subcutaneous nodules or tightening.\par \par  Abdomen\par -           Soft; Non-tender\par \par Neurologic\par -           CN 2-12 are grossly intact\par -           No sensory or motor deficits in the upper and lower extremities\par -           Adequate strength in upper and lower extremities\par \par Psychiatric\par -           Patients judgment and insight are intact\par -           Oriented to time, place and person\par -           Recent and remote memory intact.\par

## 2022-10-26 NOTE — DATA REVIEWED
[FreeTextEntry1] : SOAPP: Scored a 0 , low risk.\par  \par [place] REGISTRY: Reviewed .  \par  \par UDS: No data obtained today. \par  \par Medications that trigger a UDS: Benzodiazepines (Ativan, Xanax, Valium) etc, Barbiturates, Narcotics (Avinza, Butrans, hydrocodone, Codeine, Erinn, Methadone, Morphine, MS Contin, Opana, oxycodone, Oxycontin, Suboxone etc), Pregabalin (Lyrica), Tramadol (Ultacet, Utram etc), Tapentadol, (Nucynta) and Elist Drugs (cocaine, THC, Etc.)\par  \par Risk factors: Bipolar Illness, positive for any an illicit drugs, history of any ETOH and drug abuse, any signs of diversion, Sharing Meds, selling meds. Non consistent [place] drug reporting and above 120meq of morphine\par  \par Low risk: Patient has combination of a low risk SOAP and no risk factors. UDS would be repeated randomly every quarter\par

## 2022-10-26 NOTE — HISTORY OF PRESENT ILLNESS
[FreeTextEntry1] : HISTORY OF PRESENT ILLNESS, ORIGINAL: Mr. Mobley is a 55 year old male complaining of lower back, neck pain. The pain started after a work related injury. Today, his pain is mainly axial and worse with extension and associated with stiffness and range of motion difficulties. He denies any true radicular symptoms. He has done extensive therapy with minimal relief. The patient has had this pain for 5 months, with pain worsening over the past 18 days. Patient describes the pain as moderate to severe, being a 10/10 on the pain scale. During the last month the pain has been constant with symptoms worsening no typical pattern. Pain described as shooting, sharp, dull/aching. Pain is associated with numbness/pins and needles into the bilateral lower extremities. Patient has weakness in the bilateral upper and lower extremities. Pain is increased with lying down, standing, walking, exercising. Pain is decreased with relaxation. Pain is not changed with sitting, coughing/sneezing and bowel movements. Bowel or bladder habits have not changed.\par \par Of note, the patient takes a combination of tizanidine and gabapentin with about 30% relief.\par \par TODAY: After a long delay. He has noticed a severe flare up of his lumbar radicular pain. He notes sharp shooting pains into his legs with numbness, tingling and spasms. He is having trouble with sitting, standing or walking secondary to the pain. He currently rates the pain at a 8/10 on the pain scale. He states the pain is present daily. \par \par He is currently utilizing Gabapentin with 40% improvement in pain and increase in function. He states he has yet to receive his Tizanidine due to workers compensation not approving it.

## 2022-11-15 ENCOUNTER — APPOINTMENT (OUTPATIENT)
Dept: CARDIOLOGY | Facility: CLINIC | Age: 57
End: 2022-11-15

## 2022-11-15 VITALS
WEIGHT: 190 LBS | TEMPERATURE: 98.1 F | HEART RATE: 59 BPM | BODY MASS INDEX: 28.79 KG/M2 | SYSTOLIC BLOOD PRESSURE: 147 MMHG | HEIGHT: 68 IN | DIASTOLIC BLOOD PRESSURE: 88 MMHG

## 2022-11-15 PROCEDURE — 99214 OFFICE O/P EST MOD 30 MIN: CPT | Mod: 25

## 2022-11-15 PROCEDURE — 93000 ELECTROCARDIOGRAM COMPLETE: CPT

## 2022-12-07 ENCOUNTER — APPOINTMENT (OUTPATIENT)
Dept: PAIN MANAGEMENT | Facility: CLINIC | Age: 57
End: 2022-12-07

## 2022-12-07 VITALS — HEIGHT: 68 IN | WEIGHT: 190 LBS | BODY MASS INDEX: 28.79 KG/M2

## 2022-12-07 PROCEDURE — 99072 ADDL SUPL MATRL&STAF TM PHE: CPT

## 2022-12-07 PROCEDURE — 99215 OFFICE O/P EST HI 40 MIN: CPT

## 2022-12-07 RX ORDER — AMITRIPTYLINE HYDROCHLORIDE 25 MG/1
25 TABLET, FILM COATED ORAL
Qty: 30 | Refills: 0 | Status: ACTIVE | COMMUNITY
Start: 2022-12-07 | End: 1900-01-01

## 2022-12-13 NOTE — ASSESSMENT
[FreeTextEntry1] : 57 year old male presenting with a flare up of lumbar radicular pain. Patient is presenting with radicular pain with impairment in ADLs and functionality.  The pain has not responded to conservative care, including medications, stretching, as well as active modalities, such as physical therapy.  Imaging studies as well as physical exam findings corroborate the symptomatology and radicular pain.  We will proceed with an epidural at this point. I will proceed with a caudal epidural steroid injection without sedation. As far as his medication, I will trial Amitriptyline 25 mg nightly. Follow up in 6 weeks will be made for reassessment.\par \par Patient had a MRI that shows a radicular component along with pain referred into the lower extremity. Patient has trialed rehab (Home exercise, physical therapy or chiropractic care) and medications. I will schedule a caudal epidural steroid injection.\par \par Risk, benefits, pros and cons of procedure were explained to the patient using models and diagrams and their questions were answered. \par \par Overall there is at least a 30-50% reduction in pain with the prescribed analgesics. The patient denies any adverse side effects due to the medication (sleeping disturbance, constipation, sleepiness, hallucinations and/or urination problems).\par \par Disability status:\par Temporary total disability. Patient is unable to return to work at this time.\par \par Entered by Iva Cordova, acting as scribe for Dr. Hernandez.\par  \par The documentation recorded by the scribe, in my presence, accurately reflects the service I personally performed, and the decisions made by me with my edits as appropriate.\par  \par Best Regards, \par Lincoln Hernandez MD \par Board Certified, Anesthesiology \par Board Certified, Pain Medicine\par \par

## 2022-12-13 NOTE — PHYSICAL EXAM
[de-identified] : Constitutional\par GENERAL APPEARANCE OF PATIENT IS WELL DEVELOPED, WELL NOURISHED, BODY HABITUS NORMAL, WELL GROOMED, NO DEFORMITIES NOTED. \par \par Head\par -          Atraumatic and Normocephalic \par \par Eyes, Nose, and Throat:\par -          External inspection of ears and nose are normal overall without scars, lesions, or masses noted\par -          Assessment of hearing is normal\par \par Neck\par -          Examination of neck shows no masses, overall appearance is normal, neck is symmetric, tracheal position is midline, no crepitus is noted\par -          Examination of thyroid shows no enlargement, tenderness or masses\par \par Respiratory\par -          Assessment of respiratory effort shows no intercostal retractions, no use of accessory muscles, unlabored breathing, and normal diaphragmatic movement.\par \par Cardiovascular\par -          Examination of extremities show no edema or varicosities\par \par Musculoskeletal\par -           Inspection and palpation of digits and nails shows no clubbing, cyanosis, nodules, drainage, fluctuance, petechiae\par \par 1)         Spine- tenderness into the lumbar paraspinals. ROM Restricted. Pain with flexion. SLR positive bilaterally.\par \par 2)         Neck- inspection and palpation shows no misalignment, asymmetry, crepitation, defects, tenderness, masses, effusions. ROM is normal without crepitation or contracture. No instability or subluxation or laxity is noted. No abnormal movements.\par \par 3)         RUE- inspection and palpation shows no misalignment, asymmetry, crepitation, defects, tenderness, masses, effusions. ROM is normal without crepitation or contracture. No instability or subluxation or laxity is noted. No abnormal movements.\par \par 4)         LUE-inspection and palpation shows no misalignment, asymmetry, crepitation, defects, tenderness, masses, effusions. ROM is normal without crepitation or contracture. No instability or subluxation or laxity is noted. No abnormal movements.\par \par 5)         RLE- inspection and palpation shows no misalignment, asymmetry, crepitation, defects, tenderness, masses, effusions. ROM is normal without crepitation or contracture. No instability or subluxation or laxity is noted. No abnormal movements.\par \par 6)         LLE-inspection and palpation shows no misalignment, asymmetry, crepitation, defects, tenderness, masses, effusions. ROM is normal without crepitation or contracture. No instability or subluxation or laxity is noted. No abnormal movements.\par \par Skin\par -           Inspection of skin and subcutaneous tissue shows no rashes, lesions or ulcers\par -           Palpation of skin and subcutaneous tissue shows no rashes, no indurations, subcutaneous nodules or tightening.\par \par  Abdomen\par -           Soft; Non-tender\par \par Neurologic\par -           CN 2-12 are grossly intact\par -           No sensory or motor deficits in the upper and lower extremities\par -           Adequate strength in upper and lower extremities\par \par Psychiatric\par -           Patients judgment and insight are intact\par -           Oriented to time, place and person\par -           Recent and remote memory intact.\par

## 2022-12-13 NOTE — HISTORY OF PRESENT ILLNESS
[FreeTextEntry1] : HISTORY OF PRESENT ILLNESS, ORIGINAL: Mr. Mobley is a 55 year old male complaining of lower back, neck pain. The pain started after a work related injury. Today, his pain is mainly axial and worse with extension and associated with stiffness and range of motion difficulties. He denies any true radicular symptoms. He has done extensive therapy with minimal relief. The patient has had this pain for 5 months, with pain worsening over the past 18 days. Patient describes the pain as moderate to severe, being a 10/10 on the pain scale. During the last month the pain has been constant with symptoms worsening no typical pattern. Pain described as shooting, sharp, dull/aching. Pain is associated with numbness/pins and needles into the bilateral lower extremities. Patient has weakness in the bilateral upper and lower extremities. Pain is increased with lying down, standing, walking, exercising. Pain is decreased with relaxation. Pain is not changed with sitting, coughing/sneezing and bowel movements. Bowel or bladder habits have not changed.\par \par Of note, the patient takes a combination of tizanidine and gabapentin with about 30% relief.\par \par TODAY: After a long delay. He has noticed a severe flare up of his lumbar radicular pain. He notes sharp shooting pains into his legs with numbness, tingling and spasms. He is having trouble with sitting, standing or walking secondary to the pain. He currently rates the pain at a 8/10 on the pain scale. He states the pain is present daily. He is very frustrated by the worsening of his lower back pain. \par \par He is using Gabapentin and Baclofen which he states has not been beneficial. He states these medications just make him very drowsy.

## 2022-12-22 ENCOUNTER — APPOINTMENT (OUTPATIENT)
Dept: PLASTIC SURGERY | Facility: CLINIC | Age: 57
End: 2022-12-22
Payer: COMMERCIAL

## 2022-12-22 PROCEDURE — 20550 NJX 1 TENDON SHEATH/LIGAMENT: CPT

## 2022-12-22 NOTE — PHYSICAL EXAM
[de-identified] : well-appearing, NAD [de-identified] : BL hands with FROM, b/l elbow incisions healing appropriately, scars soft and flat

## 2022-12-22 NOTE — HISTORY OF PRESENT ILLNESS
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 Pt saw Dr. Hardin and was dx with BL BJA and cubital tunnel syndrome; he given BL kenalog injections and advised to wear BL thumb spicas. He states Dr. Hardin offered surgery but reports he would not be able to return to work after.\par \par Now c/o BL hand numbness and thumb pain. Here for second opinion.\par \par Quit smoking 7 years ago\par Nondiabetic\par \par Interval hx (7/16/20). Patient presents today for f/u to discuss treatment. EMG from February 2020 revealed bilateral cubital tunnel syndrome, no CTS. Reports improvement in basal joint pain after Kenalog injection last visit. \par \par Interval hx (10/5/20). Patient presents today POD#7 s/p BL cubital tunnel release. Doing well with appropriate incisional discomfort. Denies any fever, chills or bleeding. \par \par Interval hx (10/14/20): Patient presents today POD#16 s/p BL cubital tunnel release. Offers no complaints.\par \par Interval hx (11/24/20): Pt presents today 2 months s/p BL cubital tunnel release. Going to OT with improvement. Still c/o occasional BL elbow pain and electric shock sensation when touched. \par \par Interval hx (2/17/22): Pt presents today s/p BL cubital tunnel release.

## 2022-12-22 NOTE — ASSESSMENT
[FreeTextEntry1] : 55 y/o M with BL hand pain c/w cubital tunnel syndrome and BJA s/p kenalog x2 to each joint and thumb spica.\par Now  s/p BL release of cubital tunnel. Doing well. \par \par Due to COVID 19, pre-visit patient instructions were explained to the patient and their symptoms were checked upon arrival.  \par Masks were used by the health care providers and staff and the examination room was cleaned after the patient visit was completed.\par \par doing very well s/p cubital tunnel syndrome approx 7 wks ago--doing very well\par \par now w return of B/L BJA\par \par injected 5mg kenalog into B/L BJ\par lawanda well\par soft thumb spica splints\par \par \par doing Hand OPT w Bhavik for B/L cubtial tunnel\par \par numbeness and hand f(x) improving dramatically\par no issues\par \par f/u 6 months or prn\par \par Due to COVID 19, pre-visit patient instructions were explained to the patient and their symptoms were checked upon arrival.  \par Masks were used by the health care providers and staff and the examination room was cleaned after the patient visit was completed.\par \par \par 8/26/2021\par as above\par has return of bilateral BJA pain--L>R\par \par already has had 4 steroid injections oevr past two years\par \par suggest medrol dose pack\par B/L hand xrays\par \par pt will call after hand xrays for definitive plan\par \par discussed possible fat grafting\par \par plan after xrays\par \par Due to COVID 19, pre-visit patient instructions were explained to the patient and their symptoms were checked upon arrival.  \par Masks were used by the health care providers and staff and the examination room was cleaned after the patient visit was completed.\par \par 2/172022\par got xrays but images not available\par pt to  disc at [facility] and drop off in order to make plan for B/l BJA (has had multiple injections in the past)\par \par explained in detail to pt options and possibilities\par \par definitive plan pending\par \par Due to COVID 19, pre-visit patient instructions were explained to the patient and their symptoms were checked upon arrival.  \par Masks were used by the health care providers and staff and the examination room was cleaned after the patient visit was completed.\par \par \par 12/22/2022\par B/L BJA\par symptomatic\par requests steroid injection\par \par ionjdcted 5mg kenalog into each basal joint\par toplerated well\par \par needs B/L xrays\par \par Due to COVID 19, pre-visit patient instructions were explained to the patient and their symptoms were checked upon arrival.  \par Masks were used by the health care providers and staff and the examination room was cleaned after the patient visit was completed.\par

## 2022-12-23 ENCOUNTER — OUTPATIENT (OUTPATIENT)
Dept: OUTPATIENT SERVICES | Facility: HOSPITAL | Age: 57
LOS: 1 days | Discharge: HOME | End: 2022-12-23

## 2022-12-23 ENCOUNTER — RESULT REVIEW (OUTPATIENT)
Age: 57
End: 2022-12-23

## 2022-12-23 DIAGNOSIS — M79.641 PAIN IN RIGHT HAND: ICD-10-CM

## 2022-12-23 DIAGNOSIS — Z87.19 PERSONAL HISTORY OF OTHER DISEASES OF THE DIGESTIVE SYSTEM: Chronic | ICD-10-CM

## 2022-12-23 DIAGNOSIS — Z98.89 OTHER SPECIFIED POSTPROCEDURAL STATES: Chronic | ICD-10-CM

## 2022-12-23 DIAGNOSIS — Z87.81 PERSONAL HISTORY OF (HEALED) TRAUMATIC FRACTURE: Chronic | ICD-10-CM

## 2022-12-23 PROCEDURE — 73130 X-RAY EXAM OF HAND: CPT | Mod: 26,50

## 2022-12-29 ENCOUNTER — FORM ENCOUNTER (OUTPATIENT)
Age: 57
End: 2022-12-29

## 2023-01-05 ENCOUNTER — APPOINTMENT (OUTPATIENT)
Dept: PAIN MANAGEMENT | Facility: CLINIC | Age: 58
End: 2023-01-05
Payer: COMMERCIAL

## 2023-01-05 ENCOUNTER — APPOINTMENT (OUTPATIENT)
Dept: PAIN MANAGEMENT | Facility: CLINIC | Age: 58
End: 2023-01-05
Payer: OTHER MISCELLANEOUS

## 2023-01-05 PROCEDURE — 72100 X-RAY EXAM L-S SPINE 2/3 VWS: CPT

## 2023-01-05 PROCEDURE — 93770 DETERMINATION VENOUS PRESS: CPT

## 2023-01-05 PROCEDURE — 94761 N-INVAS EAR/PLS OXIMETRY MLT: CPT

## 2023-01-05 PROCEDURE — 99152Z: CUSTOM

## 2023-01-05 PROCEDURE — 00630 ANES PX LUMBAR REGION NOS: CPT | Mod: QZ,P2

## 2023-01-05 PROCEDURE — 93040 RHYTHM ECG WITH REPORT: CPT | Mod: 59

## 2023-01-05 PROCEDURE — 62323 NJX INTERLAMINAR LMBR/SAC: CPT

## 2023-01-05 PROCEDURE — 99072 ADDL SUPL MATRL&STAF TM PHE: CPT

## 2023-01-05 NOTE — PROCEDURE
[FreeTextEntry1] : CAUDAL EPIDURAL STEROID INJECTION [FreeTextEntry3] : Date:  2023\par \par Patient: DEBORAH HUNG\par \par :  1965\par \par \par \par \par \par Preoperative Diagnosis: Lumbar Radiculopathy\par Postoperative Diagnosis: Lumbar Radiculopathy\par \par \par Procedure:\par 1. Caudal epidural injection under fluoroscopic guidance\par 2. Epidurography\par 3. Fluoroscopic guidance and localization of needle\par \par \par Physician: Lincoln Hernandez M.D.\par Anesthesiologist/CRNA: Ms. Mcguire\par Anesthesia: MAC, See nurses notes, Versed 2mg, Fentanyl 100 mcg\par Medical Necessity:  Failure of conservative management.\par Consent:  Though unusual, the possible complications including infection, bleeding, nerve damage, hospital admission, stroke, pneumothorax, death or failure of the procedure are theoretically possible. The patient was educated about the of the procedure and alternative therapies. All questions were answered and the patient freely gave consent to proceed.\par Indication for Fluoroscopy:  This procedure requires the precise placement of the spinal needle into the epidural space.  It is the only way to accurately and safely perform the injection.\par \par \par \par PROCEDURE NOTE:\par After obtaining written consent, the patient was placed in the prone position with a pillow under the pelvis. Multiple fluoroscopic views of the sacrum-AP & Lateral- were obtained. The lower back and upper gluteal region were prepped and draped in the sterile fashion. A time out was performed.  A a 3.5inch 22 gauge spinal needle was inserted into sacral hiatus. The angle of the needle was lowered until it was felt to penetrate the sacrococcygeal ligament at which time the needle was advanced without resistance. Fluoroscopy confirmed the position of the needle within the caudal space. Omnipaque 240, 3 cc was injected to confirm an appropriate epidural spread. A total of 9 ml of preservative-free sterile saline with 1 ml of dexamethasone (10mg/cc) was injected via the needle into the caudal space.  The needle was cleared with three ml preservative-free normal saline.  There was no evidence of CSF or heme.\par \par \par \par Epidurogram Report: A spinal needle is in place in the caudal epidural space.  Central epidural spread of dye is noted from the lower sacral segments to L4-5.    Dye is seen outlining the sacral nerve roots bilaterally as they emerge from their respective foramen without obstruction.\par \par \par \par Findings: Lumbar Spine AP and oblique views with x-ray degenerative changes noted.\par \par \par \par Complications: none. \par \par \par \par Disposition: I have examined the patient and there are no new physical findings since original presentation. The patient was discharged home with a . The discharge instruction sheet was given to the patient. Motor function was intact.\par \par \par \par Comment: 1st caudal VALERY today, depending on effectiveness would schedule 2nd caudal in 1-2 weeks vs interlaminar epidural steroid vs follow up in office. Call if any problems\par \par  \par \par \par \par Lincoln Hernandez MD \par Board Certified, Anesthesiology \par Board Certified, Pain Medicine \par \par

## 2023-01-18 ENCOUNTER — APPOINTMENT (OUTPATIENT)
Dept: PAIN MANAGEMENT | Facility: CLINIC | Age: 58
End: 2023-01-18
Payer: OTHER MISCELLANEOUS

## 2023-01-18 VITALS — WEIGHT: 190 LBS | BODY MASS INDEX: 28.79 KG/M2 | HEIGHT: 68 IN

## 2023-01-18 PROCEDURE — 99072 ADDL SUPL MATRL&STAF TM PHE: CPT

## 2023-01-18 PROCEDURE — 99215 OFFICE O/P EST HI 40 MIN: CPT

## 2023-01-18 NOTE — ASSESSMENT
[FreeTextEntry1] : 57 year old male presenting with a flare up of lumbar radicular pain. Patient is presenting with radicular pain with impairment in ADLs and functionality.  The pain has not responded to conservative care, including medications, stretching, as well as active modalities, such as physical therapy.  Imaging studies as well as physical exam findings corroborate the symptomatology and radicular pain.  We will proceed with an epidural at this point. I will proceed with a caudal epidural steroid injection without sedation. As far as his medication, I will continue with Gabapentin 600 TID and Tizanidine 4 mg BID. I will add on Ibuprofen 600 mg BID. As for the headaches, I advised him to see a neurologist ASAP. Follow up in 6 weeks will be made for reassessment.\par \par Patient had a MRI that shows a radicular component along with pain referred into the lower extremity. Patient has trialed rehab (Home exercise, physical therapy or chiropractic care) and medications. I will schedule a caudal epidural steroid injection.\par \par Risk, benefits, pros and cons of procedure were explained to the patient using models and diagrams and their questions were answered. \par \par Overall there is at least a 30-50% reduction in pain with the prescribed analgesics. The patient denies any adverse side effects due to the medication (sleeping disturbance, constipation, sleepiness, hallucinations and/or urination problems).\par \par Disability status:\par Temporary total disability. Patient is unable to return to work at this time.\par \par Entered by Iva Cordova, acting as scribe for Dr. Hernandez.\par  \par The documentation recorded by the scribe, in my presence, accurately reflects the service I personally performed, and the decisions made by me with my edits as appropriate.\par  \par Best Regards, \par Lincoln Hernandez MD \par Board Certified, Anesthesiology \par Board Certified, Pain Medicine\par \par

## 2023-01-18 NOTE — PHYSICAL EXAM
[de-identified] : Constitutional\par GENERAL APPEARANCE OF PATIENT IS WELL DEVELOPED, WELL NOURISHED, BODY HABITUS NORMAL, WELL GROOMED, NO DEFORMITIES NOTED. \par \par Head\par -          Atraumatic and Normocephalic \par \par Eyes, Nose, and Throat:\par -          External inspection of ears and nose are normal overall without scars, lesions, or masses noted\par -          Assessment of hearing is normal\par \par Neck\par -          Examination of neck shows no masses, overall appearance is normal, neck is symmetric, tracheal position is midline, no crepitus is noted\par -          Examination of thyroid shows no enlargement, tenderness or masses\par \par Respiratory\par -          Assessment of respiratory effort shows no intercostal retractions, no use of accessory muscles, unlabored breathing, and normal diaphragmatic movement.\par \par Cardiovascular\par -          Examination of extremities show no edema or varicosities\par \par Musculoskeletal\par -           Inspection and palpation of digits and nails shows no clubbing, cyanosis, nodules, drainage, fluctuance, petechiae\par \par 1)         Spine- tenderness into the lumbar paraspinals. ROM Restricted. Pain with flexion. SLR positive bilaterally.\par \par 2)         Neck- inspection and palpation shows no misalignment, asymmetry, crepitation, defects, tenderness, masses, effusions. ROM is normal without crepitation or contracture. No instability or subluxation or laxity is noted. No abnormal movements.\par \par 3)         RUE- inspection and palpation shows no misalignment, asymmetry, crepitation, defects, tenderness, masses, effusions. ROM is normal without crepitation or contracture. No instability or subluxation or laxity is noted. No abnormal movements.\par \par 4)         LUE-inspection and palpation shows no misalignment, asymmetry, crepitation, defects, tenderness, masses, effusions. ROM is normal without crepitation or contracture. No instability or subluxation or laxity is noted. No abnormal movements.\par \par 5)         RLE- inspection and palpation shows no misalignment, asymmetry, crepitation, defects, tenderness, masses, effusions. ROM is normal without crepitation or contracture. No instability or subluxation or laxity is noted. No abnormal movements.\par \par 6)         LLE-inspection and palpation shows no misalignment, asymmetry, crepitation, defects, tenderness, masses, effusions. ROM is normal without crepitation or contracture. No instability or subluxation or laxity is noted. No abnormal movements.\par \par Skin\par -           Inspection of skin and subcutaneous tissue shows no rashes, lesions or ulcers\par -           Palpation of skin and subcutaneous tissue shows no rashes, no indurations, subcutaneous nodules or tightening.\par \par  Abdomen\par -           Soft; Non-tender\par \par Neurologic\par -           CN 2-12 are grossly intact\par -           No sensory or motor deficits in the upper and lower extremities\par -           Adequate strength in upper and lower extremities\par \par Psychiatric\par -           Patients judgment and insight are intact\par -           Oriented to time, place and person\par -           Recent and remote memory intact.\par

## 2023-01-18 NOTE — HISTORY OF PRESENT ILLNESS
[FreeTextEntry1] : HISTORY OF PRESENT ILLNESS, ORIGINAL: Mr. Mobley is a 55 year old male complaining of lower back, neck pain. The pain started after a work related injury. Today, his pain is mainly axial and worse with extension and associated with stiffness and range of motion difficulties. He denies any true radicular symptoms. He has done extensive therapy with minimal relief. The patient has had this pain for 5 months, with pain worsening over the past 18 days. Patient describes the pain as moderate to severe, being a 10/10 on the pain scale. During the last month the pain has been constant with symptoms worsening no typical pattern. Pain described as shooting, sharp, dull/aching. Pain is associated with numbness/pins and needles into the bilateral lower extremities. Patient has weakness in the bilateral upper and lower extremities. Pain is increased with lying down, standing, walking, exercising. Pain is decreased with relaxation. Pain is not changed with sitting, coughing/sneezing and bowel movements. Bowel or bladder habits have not changed.\par \par Of note, the patient takes a combination of tizanidine and gabapentin with about 30% relief.\par \par TODAY: Since last visit, he underwent a caudal epidural steroid injection on 1-5-2023 which provided him with approximately 75% relief of his lower back pain. He states that right after the injection the pain went down to a 2-3/10 on the pain scale.\par \par He is presenting today with some residual lower back pain. He states the pain continues to radiate into the lower extremities. He notes associated numbness, tingling and spasms. He has trouble with standing or walking secondary to the pain. He currently rates his pain at a 7/10 on the pain scale. \par \par He is also presenting with a flare up of neck pain. He states the pain is in the neck with radiation into the upper extremities. He also notes some pain which radiates into the head causing severe headaches. He also gives a episode of seeing a bright light in his eyes for which he had to close his eyes. He also noted pressure in the head at the time. He currently rates the pain at a 7/10 on the pain scale. \par \par He is using Gabapentin and Amitriptyline. He states that he stopped taking the Amitriptyline due to a adverse reaction making him drowsy and his blood pressure rising.

## 2023-01-26 ENCOUNTER — APPOINTMENT (OUTPATIENT)
Dept: NEUROLOGY | Facility: CLINIC | Age: 58
End: 2023-01-26
Payer: COMMERCIAL

## 2023-01-26 VITALS
WEIGHT: 181 LBS | HEIGHT: 68 IN | HEART RATE: 64 BPM | SYSTOLIC BLOOD PRESSURE: 168 MMHG | DIASTOLIC BLOOD PRESSURE: 84 MMHG | BODY MASS INDEX: 27.43 KG/M2

## 2023-01-26 PROCEDURE — 99214 OFFICE O/P EST MOD 30 MIN: CPT

## 2023-01-27 ENCOUNTER — APPOINTMENT (OUTPATIENT)
Age: 58
End: 2023-01-27
Payer: COMMERCIAL

## 2023-01-27 ENCOUNTER — OUTPATIENT (OUTPATIENT)
Dept: OUTPATIENT SERVICES | Facility: HOSPITAL | Age: 58
LOS: 1 days | Discharge: HOME | End: 2023-01-27
Payer: COMMERCIAL

## 2023-01-27 ENCOUNTER — RESULT REVIEW (OUTPATIENT)
Age: 58
End: 2023-01-27

## 2023-01-27 VITALS
SYSTOLIC BLOOD PRESSURE: 138 MMHG | OXYGEN SATURATION: 98 % | BODY MASS INDEX: 27.43 KG/M2 | HEART RATE: 78 BPM | HEIGHT: 68 IN | DIASTOLIC BLOOD PRESSURE: 82 MMHG | WEIGHT: 181 LBS

## 2023-01-27 DIAGNOSIS — Z98.89 OTHER SPECIFIED POSTPROCEDURAL STATES: Chronic | ICD-10-CM

## 2023-01-27 DIAGNOSIS — Z87.81 PERSONAL HISTORY OF (HEALED) TRAUMATIC FRACTURE: Chronic | ICD-10-CM

## 2023-01-27 DIAGNOSIS — R06.02 SHORTNESS OF BREATH: ICD-10-CM

## 2023-01-27 DIAGNOSIS — Z87.19 PERSONAL HISTORY OF OTHER DISEASES OF THE DIGESTIVE SYSTEM: Chronic | ICD-10-CM

## 2023-01-27 PROCEDURE — 71046 X-RAY EXAM CHEST 2 VIEWS: CPT | Mod: 26

## 2023-01-27 PROCEDURE — 99213 OFFICE O/P EST LOW 20 MIN: CPT

## 2023-01-27 NOTE — DISCUSSION/SUMMARY
[FreeTextEntry1] : SOB AT REST \par REMOTE HX OF ASTHMA AS NEEDED INHALERS\par PND\par NEEDS PFT\par GLADIS / HST REVIEWED

## 2023-01-29 ENCOUNTER — FORM ENCOUNTER (OUTPATIENT)
Age: 58
End: 2023-01-29

## 2023-02-02 ENCOUNTER — APPOINTMENT (OUTPATIENT)
Dept: PLASTIC SURGERY | Facility: CLINIC | Age: 58
End: 2023-02-02
Payer: OTHER MISCELLANEOUS

## 2023-02-02 PROCEDURE — 99072 ADDL SUPL MATRL&STAF TM PHE: CPT

## 2023-02-02 PROCEDURE — 99212 OFFICE O/P EST SF 10 MIN: CPT

## 2023-02-02 NOTE — ASSESSMENT
[FreeTextEntry1] : 55 y/o M with BL hand pain c/w cubital tunnel syndrome and BJA s/p kenalog x2 to each joint and thumb spica.\par Now  s/p BL release of cubital tunnel. Doing well. \par \par Due to COVID 19, pre-visit patient instructions were explained to the patient and their symptoms were checked upon arrival.  \par Masks were used by the health care providers and staff and the examination room was cleaned after the patient visit was completed.\par \par doing very well s/p cubital tunnel syndrome approx 7 wks ago--doing very well\par \par now w return of B/L BJA\par \par injected 5mg kenalog into B/L BJ\par lawanda well\par soft thumb spica splints\par \par \par doing Hand OPT w Bhavik for B/L cubtial tunnel\par \par numbeness and hand f(x) improving dramatically\par no issues\par \par f/u 6 months or prn\par \par Due to COVID 19, pre-visit patient instructions were explained to the patient and their symptoms were checked upon arrival.  \par Masks were used by the health care providers and staff and the examination room was cleaned after the patient visit was completed.\par \par \par 8/26/2021\par as above\par has return of bilateral BJA pain--L>R\par \par already has had 4 steroid injections oevr past two years\par \par suggest medrol dose pack\par B/L hand xrays\par \par pt will call after hand xrays for definitive plan\par \par discussed possible fat grafting\par \par plan after xrays\par \par Due to COVID 19, pre-visit patient instructions were explained to the patient and their symptoms were checked upon arrival.  \par Masks were used by the health care providers and staff and the examination room was cleaned after the patient visit was completed.\par \par 2/172022\par got xrays but images not available\par pt to  disc at [facility] and drop off in order to make plan for B/l BJA (has had multiple injections in the past)\par \par explained in detail to pt options and possibilities\par \par definitive plan pending\par \par Due to COVID 19, pre-visit patient instructions were explained to the patient and their symptoms were checked upon arrival.  \par Masks were used by the health care providers and staff and the examination room was cleaned after the patient visit was completed.\par \par \par 12/22/2022\par B/L BJA\par symptomatic\par requests steroid injection\par \par ionjdcted 5mg kenalog into each basal joint\par toplerated well\par \par needs B/L xrays\par \par Due to COVID 19, pre-visit patient instructions were explained to the patient and their symptoms were checked upon arrival.  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  We discussed prolonged recovery.  All questions were answered and all risks were well understood by the patient.\par \par

## 2023-02-02 NOTE — PHYSICAL EXAM
[de-identified] : well-appearing, NAD [de-identified] : BL hands with FROM, b/l elbow incisions healing appropriately, scars soft and flat

## 2023-02-06 ENCOUNTER — RESULT REVIEW (OUTPATIENT)
Age: 58
End: 2023-02-06

## 2023-02-06 ENCOUNTER — OUTPATIENT (OUTPATIENT)
Dept: OUTPATIENT SERVICES | Facility: HOSPITAL | Age: 58
LOS: 1 days | End: 2023-02-06
Payer: COMMERCIAL

## 2023-02-06 DIAGNOSIS — Z98.89 OTHER SPECIFIED POSTPROCEDURAL STATES: Chronic | ICD-10-CM

## 2023-02-06 DIAGNOSIS — Z87.19 PERSONAL HISTORY OF OTHER DISEASES OF THE DIGESTIVE SYSTEM: Chronic | ICD-10-CM

## 2023-02-06 DIAGNOSIS — Z00.8 ENCOUNTER FOR OTHER GENERAL EXAMINATION: ICD-10-CM

## 2023-02-06 DIAGNOSIS — Z87.81 PERSONAL HISTORY OF (HEALED) TRAUMATIC FRACTURE: Chronic | ICD-10-CM

## 2023-02-06 PROCEDURE — 72141 MRI NECK SPINE W/O DYE: CPT | Mod: 26

## 2023-02-06 PROCEDURE — 93880 EXTRACRANIAL BILAT STUDY: CPT | Mod: 26

## 2023-02-06 PROCEDURE — 93880 EXTRACRANIAL BILAT STUDY: CPT

## 2023-02-06 PROCEDURE — 72141 MRI NECK SPINE W/O DYE: CPT

## 2023-02-06 PROCEDURE — 70551 MRI BRAIN STEM W/O DYE: CPT

## 2023-02-06 PROCEDURE — 70551 MRI BRAIN STEM W/O DYE: CPT | Mod: 26

## 2023-02-07 ENCOUNTER — RX RENEWAL (OUTPATIENT)
Age: 58
End: 2023-02-07

## 2023-02-07 RX ORDER — FLUTICASONE PROPIONATE 50 UG/1
50 SPRAY, METERED NASAL
Qty: 48 | Refills: 3 | Status: ACTIVE | COMMUNITY
Start: 2021-08-04 | End: 1900-01-01

## 2023-02-08 DIAGNOSIS — M54.12 RADICULOPATHY, CERVICAL REGION: ICD-10-CM

## 2023-02-08 DIAGNOSIS — R51.9 HEADACHE, UNSPECIFIED: ICD-10-CM

## 2023-02-09 ENCOUNTER — APPOINTMENT (OUTPATIENT)
Dept: NEUROLOGY | Facility: CLINIC | Age: 58
End: 2023-02-09
Payer: COMMERCIAL

## 2023-02-09 PROCEDURE — 95816 EEG AWAKE AND DROWSY: CPT

## 2023-02-23 ENCOUNTER — APPOINTMENT (OUTPATIENT)
Dept: NEUROLOGY | Facility: CLINIC | Age: 58
End: 2023-02-23
Payer: COMMERCIAL

## 2023-02-23 VITALS
HEIGHT: 68 IN | DIASTOLIC BLOOD PRESSURE: 92 MMHG | WEIGHT: 183 LBS | SYSTOLIC BLOOD PRESSURE: 154 MMHG | BODY MASS INDEX: 27.74 KG/M2 | HEART RATE: 65 BPM

## 2023-02-23 DIAGNOSIS — M54.12 RADICULOPATHY, CERVICAL REGION: ICD-10-CM

## 2023-02-23 DIAGNOSIS — R51.9 HEADACHE, UNSPECIFIED: ICD-10-CM

## 2023-02-23 PROCEDURE — 99214 OFFICE O/P EST MOD 30 MIN: CPT

## 2023-02-23 NOTE — HISTORY OF PRESENT ILLNESS
[FreeTextEntry1] : Mr. DEBORAH HUNG returns to the office for follow-up and his prior history and physical have been reviewed and he reports no change since last visit.  he Was previously seen for lumbar radiculopathy as a result of a work related accident, but today  he is here for a new complaint in- related to that accident.  He reports that couple weeks ago while watching TV all the sudden he developed visual disturbance seeing bright rings that obscure his vision.  He denies headache at that time.  He went to bed and when he woke up the next morning the symptom has resolved.  He had a 2nd episode similar which also resolved after sleeping.  Soon after he developed severe headaches that he has never had,   But that also resolved after sleep.  He went to see ophthalmologist would not find any significant  intrinsic eye issue and was told this could have been ocular migraine.  Since this happened he also has been having a lot of neck pain going down his arms as well as tension-type headache.  He did have a even prior work related accident regarding his neck which he has not seen anybody for for long time.

## 2023-02-23 NOTE — ASSESSMENT
[FreeTextEntry1] :  visual disturbance\par -Most likely migrainous but could not rule out epileptic phenomenon.  Since he has not had any further episodes, will hold off ordering 24/48 hour EEG.   if he were to have additional episodes, I will send him for the prolonged EEG monitoring.\par - his current headache is mostly cervicogenic, therefore I recommend physical therapy as well as pain management for his neck\par \par \par \par \par I, Brenda Rajan, Attest that this documentation has been prepared under the direction and in the presence of Provider Dany Bell DO\migel \par Thank You for letting me assist in the management of this patient. \par \par Dany Bell DO\migel Board Certified, Neurology\par  \par

## 2023-02-23 NOTE — HISTORY OF PRESENT ILLNESS
[FreeTextEntry1] : Mr. Mobley is a 57 year old man who comes in for a follow up in regards to his headaches  as well as 2 episodes of visual disturbance which could be migrainous or epileptic in nature.  He was sent for MRI of the brain as well as his EEG, both of which were normal.   he has not had any additional episodes of visual disturbance but continues to have headaches that are tension-like/cervicogenic in nature since it always starts in the neck which travels up to his head with pressure sensation\par \par He takes Tizanidine before bedtime with no complaints. He stopped taking Baclofen Since he did not find it more helpful than tizanidine\par \par   He is seen pain management for his chronic cervical radiculopathy\par \par \par \par \par

## 2023-02-23 NOTE — ASSESSMENT
[FreeTextEntry1] : Ocular Migraine vs occipital lobe seizure\par \par - EEG \par - MRI of the Brain Without Contrast. \par -He was advised that if he were to have an acute severe headache, he should head to the hospital for evaluation, and he reported understanding\par \par Cervical radiculopathy\par -MRI of the cervical spine\par -EMG nerve conduction study of the upper extremities\par -A trial of physical therapy\par \par \par \par \par \par I, Brenda Rajan, Attest that this documentation has been prepared under the direction and in the presence of Provider Dany Bell DO\par \par Thank You for letting me assist in the management of this patient. \par \par Dany Bell DO\par Board Certified, Neurology\par

## 2023-03-01 ENCOUNTER — APPOINTMENT (OUTPATIENT)
Dept: PAIN MANAGEMENT | Facility: CLINIC | Age: 58
End: 2023-03-01
Payer: OTHER MISCELLANEOUS

## 2023-03-01 PROCEDURE — 72100 X-RAY EXAM L-S SPINE 2/3 VWS: CPT

## 2023-03-01 PROCEDURE — 62323 NJX INTERLAMINAR LMBR/SAC: CPT

## 2023-03-01 PROCEDURE — 93040 RHYTHM ECG WITH REPORT: CPT | Mod: 59

## 2023-03-01 PROCEDURE — 99072 ADDL SUPL MATRL&STAF TM PHE: CPT

## 2023-03-01 PROCEDURE — 93770 DETERMINATION VENOUS PRESS: CPT

## 2023-03-01 PROCEDURE — 99152Z: CUSTOM

## 2023-03-01 NOTE — PROCEDURE
[FreeTextEntry1] : CAUDAL EPIDURAL STEROID INJECTION [FreeTextEntry3] : CAUDAL EPIDURAL STEROID INJECTION\par \par \par \par \par Date:  2023\par \par Patient: Jesús Mobley\par \par :  1965\par \par \par Preoperative Diagnosis: Lumbar Radiculopathy \par Postoperative Diagnosis: Lumbar Radiculopathy \par \par \par Procedure: \par 1. Caudal epidural injection under fluoroscopic guidance\par 2. Epidurography\par 3. Fluoroscopic guidance and localization of needle \par \par \par Physician: Lincoln Hernandez M.D.\par Anesthesia: IV Sedation versed mg, fentanyl mcg\par Medical Necessity:  Failure of conservative management.\par Consent:  Though unusual, the possible complications including infection, bleeding, nerve damage, hospital admission, stroke, pneumothorax, death or failure of the procedure are theoretically possible. The patient was educated about the of the procedure and alternative therapies. All questions were answered and the patient freely gave consent to proceed.\par Indication for Fluoroscopy:  This procedure requires the precise placement of the spinal needle into the epidural space.  It is the only way to accurately and safely perform the injection.\par \par \par \par \par PROCEDURE NOTE: \par After obtaining written consent, the patient was placed in the prone position with a pillow under the pelvis. Multiple fluoroscopic views of the sacrum-AP & Lateral- were obtained. The lower back and upper gluteal region were prepped and draped in the sterile fashion. A time out was performed.  A a 3.5inch 22 gauge spinal needle was inserted into sacral hiatus. The angle of the needle was lowered until it was felt to penetrate the sacrococcygeal ligament at which time the needle was advanced without resistance. Fluoroscopy confirmed the position of the needle within the caudal space. Omnipaque 240, 3 cc was injected to confirm an appropriate epidural spread. A total of 9 ml of preservative-free sterile saline with 1 ml of dexamethasone (10mg/cc) was injected via the needle into the caudal space.  The needle was cleared with three ml preservative-free normal saline.  There was no evidence of CSF or heme.\par \par \par \par \par Epidurogram Report: A spinal needle is in place in the caudal epidural space.  Central epidural spread of dye is noted from the lower sacral segments to L4-5.    Dye is seen outlining the sacral nerve roots bilaterally as they emerge from their respective foramen without obstruction.\par \par \par \par \par Findings: Lumbar Spine AP and oblique views with x-ray degenerative changes noted.\par \par \par \par \par Complications: none.  \par \par \par \par \par Disposition: I have examined the patient and there are no new physical findings since original presentation. The patient was discharged home with a . The discharge instruction sheet was given to the patient. Motor function was intact.\par \par \par \par \par Comment: 1st caudal VALERY today, depending on effectiveness would schedule 2nd caudal in 1-2 weeks vs interlaminar epidural steroid vs follow up in office. Call if any problems\par \par  \par \par This document was signed by:\par \par Lincoln Hernandez MD  \par Board Certified, Anesthesiology  \par Board Certified, Pain Medicine  \par \par \par

## 2023-03-02 ENCOUNTER — APPOINTMENT (OUTPATIENT)
Dept: PAIN MANAGEMENT | Facility: CLINIC | Age: 58
End: 2023-03-02

## 2023-03-30 ENCOUNTER — APPOINTMENT (OUTPATIENT)
Dept: PAIN MANAGEMENT | Facility: CLINIC | Age: 58
End: 2023-03-30
Payer: OTHER MISCELLANEOUS

## 2023-03-30 VITALS — WEIGHT: 183 LBS | BODY MASS INDEX: 27.74 KG/M2 | HEIGHT: 68 IN

## 2023-03-30 PROCEDURE — 99215 OFFICE O/P EST HI 40 MIN: CPT

## 2023-03-30 NOTE — ASSESSMENT
[FreeTextEntry1] : Patient with chronic lower back pain with chronic radiculopathy. He has failed lumbar injection therapy and intervention treatment at this time. At this time I would like for him to see Dr. Campos for neurosurgical consultation. If no surgery is recommended we will consider SCS trial. I will see him back once completed.\par \par Entered by Kaveh Starr, acting as scribe for Dr. Hernandez.\par  \par The documentation recorded by the scribe, in my presence, accurately reflects the service I personally performed, and the decisions made by me with my edits as appropriate.\par  \par Best Regards, \par Lincoln Hernandez MD \par Board Certified, Anesthesiology \par Board Certified, Pain Medicine\par

## 2023-03-30 NOTE — PHYSICAL EXAM
[de-identified] : Spine- tenderness into the lumbar paraspinals. ROM Restricted. Pain with flexion. SLR positive bilaterally.\par

## 2023-03-30 NOTE — HISTORY OF PRESENT ILLNESS
[FreeTextEntry1] : HISTORY OF PRESENT ILLNESS, ORIGINAL: Mr. Mobley is a 55 year old male complaining of lower back, neck pain. The pain started after a work related injury. Today, his pain is mainly axial and worse with extension and associated with stiffness and range of motion difficulties. He denies any true radicular symptoms. He has done extensive therapy with minimal relief. The patient has had this pain for 5 months, with pain worsening over the past 18 days. Patient describes the pain as moderate to severe, being a 10/10 on the pain scale. During the last month the pain has been constant with symptoms worsening no typical pattern. Pain described as shooting, sharp, dull/aching. Pain is associated with numbness/pins and needles into the bilateral lower extremities. Patient has weakness in the bilateral upper and lower extremities. Pain is increased with lying down, standing, walking, exercising. Pain is decreased with relaxation. Pain is not changed with sitting, coughing/sneezing and bowel movements. Bowel or bladder habits have not changed.\par \par Of note, the patient takes a combination of tizanidine and gabapentin with about 30% relief.\par \par TODAY: He is presenting today with some residual lower back pain. He states the pain continues to radiate into the lower extremities. He notes associated numbness, tingling and spasms. He has trouble with standing or walking secondary to the pain. He currently rates his pain at a 7/10 on the pain scale. He is s/p lumbar caudal epidural steroid injection with minimal relief. His pain remains 9/10 in regards to his back pain traveling into the buttocks. He is frustrated by his symptoms. He has trouble sleeping. He has been using an aggressive home exercise program with no relief. He has been taking Gabapentin with 30-40% relief.

## 2023-03-31 ENCOUNTER — APPOINTMENT (OUTPATIENT)
Dept: ORTHOPEDIC SURGERY | Facility: CLINIC | Age: 58
End: 2023-03-31
Payer: OTHER MISCELLANEOUS

## 2023-03-31 PROCEDURE — 99214 OFFICE O/P EST MOD 30 MIN: CPT

## 2023-03-31 NOTE — PHYSICAL EXAM
[de-identified] : TTP midline spine and paraspinal musculature \par Strength                                         \par Hip flexor\par   Right: 5/5; Left: 5/5                             \par Knee extensor  \par   Right: 5/5; Left: 5/5                     \par Ankle dorsiflexion\par   Right: 5/5; Left: 5/5                  \par EHL        \par   Right: 5/5; Left: 5/5                                \par Ankle plantarflexion    \par   Right: 5/5; Left: 5/5\par \par Sensation\par L1\par   Right: 2/2; Left: 2/2\par L2\par   Right: 2/2; Left: 2/2\par L3\par   Right: 2/2; Left: 2/2\par L4\par   Right: 2/2; Left: 2/2\par L5\par   Right: 2/2; Left: 2/2\par S1\par   Right: 2/2; Left: 2/2\par \par Reflexes\par Patella\par   Right: 2+; Left 2+\par Achilles\par   Right: 2+; Left 2+\par Clonus\par  Right: absent; L: absent\par \par TTP midline cervical spine and paraspinal musculature \par \par Strength                                                                    \par Deltoid\par   Right: 5/5; Left: 5/5                     \par Biceps\par   Right: 5/5; Left: 5/5                  \par Triceps     \par   Right: 5/5; Left: 5/5                                \par Wrist Extensors  \par   Right: 5/5; Left: 5/5\par Finger Flexors  \par   Right: 5/5; Left: 5/5\par IO \par   Right: 5/5; Left: 5/5\par \par Sensation\par C5\par   Right: 2/2; Left: 2/2\par C6\par   Right: 2/2; Left: 2/2\par C7\par   Right: 2/2; Left: 2/2\par C8\par   Right: 2/2; Left: 2/2\par T1\par   Right: 2/2; Left: 2/2\par \par Reflexes\par Biceps\par   Right: 2+; Left 2+\par Triceps\par   Right: 2+; Left 2+\par Stafford's\par  Right: Negative; L: Negative\par

## 2023-03-31 NOTE — DISCUSSION/SUMMARY
[de-identified] : 57-year-old male with symptoms of neurogenic claudication and lumbar radiculopathy.  I am ordering an MRI of the patient's lumbar spine.  He is also symptoms of cervical radiculopathy however the lumbar bothers him more.  Has failed conservative care.  His most recent MRI was in 2020 he now needs an updated one to make clinical decisions regarding surgery.  I will see him back after the MRI of the lumbar spine is complete.

## 2023-03-31 NOTE — HISTORY OF PRESENT ILLNESS
[de-identified] : 57-year-old male presents with neck pain and low back pain that has been going on since a work-related accident in 2020.  The patient's back pain radiates down bilateral legs down to the knees.  The patient feels electric pain also when he walks he feels like his legs get heavy and weak.  When he rests sensation improves when he resumes walking sensation returns.  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  He he has had multiple injections and those have provided only temporary relief.  He had his last MRI of his lumbar spine was from 2020 does not have the results of this.  With regards to his cervical spine that bothers him less than his lumbar spine occasionally the pain radiates down his left arm and into his hand.  Denies loss of bladder or bowel.  Denies balance issues.  Denies hand clumsiness.

## 2023-04-17 ENCOUNTER — APPOINTMENT (OUTPATIENT)
Dept: PLASTIC SURGERY | Facility: AMBULATORY SURGERY CENTER | Age: 58
End: 2023-04-17

## 2023-04-27 ENCOUNTER — APPOINTMENT (OUTPATIENT)
Dept: NEUROLOGY | Facility: CLINIC | Age: 58
End: 2023-04-27
Payer: OTHER MISCELLANEOUS

## 2023-04-27 VITALS — WEIGHT: 183 LBS | HEIGHT: 68 IN | BODY MASS INDEX: 27.74 KG/M2

## 2023-04-27 PROCEDURE — 99213 OFFICE O/P EST LOW 20 MIN: CPT

## 2023-04-27 NOTE — HISTORY OF PRESENT ILLNESS
[FreeTextEntry1] : Mr. Mobley is a 57 year old man who comes in for a Workers Comp Related case 9/16/2016 He has unchanged pain since his last visit. Patient is under the care of Pain Management for his ongoing chronic cervical radiculopathy. He gets headaches when he wakes up. Patient has unchanged pain since his last visit. He will continue to follow up with Pain Management. MRI Of the Brain ruled out tumor, bleeding, fracture, and stroke. \par \par \par \par \par \par \par \par \par \par \par Mr. Mobley is a 57 year old man who comes in for a follow up in regards to his headaches  as well as 2 episodes of visual disturbance which could be migrainous or epileptic in nature.  He was sent for MRI of the brain as well as his EEG, both of which were normal.   he has not had any additional episodes of visual disturbance but continues to have headaches that are tension-like/cervicogenic in nature since it always starts in the neck which travels to head.  He takes Tizanidine before bedtime. He is following up with Pain Management for his chronic cervical radiculopathy.\par \par \par \par \par

## 2023-04-27 NOTE — ASSESSMENT
[FreeTextEntry1] : Workers Comp / Follow Up. \par \par - Physical Therapy. \par - I Will Follow up with him in One Month. \par \par \par \par \par \par \par \par I, Brenda Rajan, Attest that this documentation has been prepared under the direction and in the presence of Provider Dany Bell DO\par \par Thank You for letting me assist in the management of this patient. \par \par Dany Bell DO\migel Board Certified, Neurology\par

## 2023-04-28 ENCOUNTER — APPOINTMENT (OUTPATIENT)
Dept: ORTHOPEDIC SURGERY | Facility: CLINIC | Age: 58
End: 2023-04-28
Payer: OTHER MISCELLANEOUS

## 2023-04-28 PROCEDURE — 99214 OFFICE O/P EST MOD 30 MIN: CPT

## 2023-04-28 NOTE — DISCUSSION/SUMMARY
[de-identified] : 57-year-old male with neurogenic claudication and some radiculopathy secondary to L2-3 spinal stenosis.  I recommend an L2-3 laminectomy.  I discussed with the patient that the goals of the surgery are relief of the patient's lower extremity claudicant and radicular pain.  I stressed to the patient that he will still have back pain following surgery.  Discussed risk of the surgery with the patient including infection, wound healing, incomplete pain relief, worsening pain, need for repeat surgeries including fusions, continued degeneration of his spine, adjacent segment disease, iatrogenic instability, dural tear, nerve injury, paralysis, organ damage, bleeding, blood clots, and even death patient understands this.  We went over the the surgery and the recovery process.  We will proceed with obtaining authorization and booking surgery.

## 2023-04-28 NOTE — PHYSICAL EXAM
[de-identified] : TTP midline spine and paraspinal musculature \par Strength                                         \par Hip flexor\par   Right: 5/5; Left: 5/5                             \par Knee extensor  \par   Right: 5/5; Left: 5/5                     \par Ankle dorsiflexion\par   Right: 5/5; Left: 5/5                  \par EHL        \par   Right: 5/5; Left: 5/5                                \par Ankle plantarflexion    \par   Right: 5/5; Left: 5/5\par \par Sensation\par L1\par   Right: 2/2; Left: 2/2\par L2\par   Right: 2/2; Left: 2/2\par L3\par   Right: 2/2; Left: 2/2\par L4\par   Right: 2/2; Left: 2/2\par L5\par   Right: 2/2; Left: 2/2\par S1\par   Right: 2/2; Left: 2/2\par \par Reflexes\par Patella\par   Right: 2+; Left 2+\par Achilles\par   Right: 2+; Left 2+\par Clonus\par  Right: absent; L: absent\par

## 2023-04-28 NOTE — HISTORY OF PRESENT ILLNESS
[de-identified] : 57-year-old male continues to have pain in his low back radiating through her buttocks and thighs.  The pain he describes as a burning numbness and tingling.  He also describes an ache.  He has failed multiple epidural steroid injections and other type of pain management procedures.  The pain really interferes with his daily life.  He has an updated MRI would like to discuss surgical options.

## 2023-04-28 NOTE — DATA REVIEWED
[FreeTextEntry1] : I reviewed the MRI of his lumbar spine done at [facility] on April 11, 2023.  This demonstrates severe spinal stenosis at L2-3.

## 2023-05-01 ENCOUNTER — APPOINTMENT (OUTPATIENT)
Dept: PLASTIC SURGERY | Facility: CLINIC | Age: 58
End: 2023-05-01

## 2023-05-02 ENCOUNTER — APPOINTMENT (OUTPATIENT)
Dept: CARDIOLOGY | Facility: CLINIC | Age: 58
End: 2023-05-02
Payer: COMMERCIAL

## 2023-05-02 ENCOUNTER — RESULT CHARGE (OUTPATIENT)
Age: 58
End: 2023-05-02

## 2023-05-02 VITALS
HEIGHT: 66 IN | WEIGHT: 189 LBS | BODY MASS INDEX: 30.37 KG/M2 | HEART RATE: 59 BPM | SYSTOLIC BLOOD PRESSURE: 130 MMHG | DIASTOLIC BLOOD PRESSURE: 70 MMHG

## 2023-05-02 PROCEDURE — 93000 ELECTROCARDIOGRAM COMPLETE: CPT

## 2023-05-02 PROCEDURE — 99214 OFFICE O/P EST MOD 30 MIN: CPT | Mod: 25

## 2023-05-02 RX ORDER — GABAPENTIN 300 MG/1
300 CAPSULE ORAL 3 TIMES DAILY
Qty: 180 | Refills: 0 | Status: DISCONTINUED | COMMUNITY
Start: 2022-10-20 | End: 2023-05-02

## 2023-05-02 RX ORDER — BACLOFEN 10 MG/1
10 TABLET ORAL TWICE DAILY
Qty: 60 | Refills: 1 | Status: DISCONTINUED | COMMUNITY
Start: 2022-10-26 | End: 2023-05-02

## 2023-05-02 RX ORDER — HYDROCORTISONE 10 MG/G
1 CREAM TOPICAL TWICE DAILY
Qty: 30 | Refills: 0 | Status: DISCONTINUED | COMMUNITY
Start: 2022-01-12 | End: 2023-05-02

## 2023-05-22 ENCOUNTER — RX RENEWAL (OUTPATIENT)
Age: 58
End: 2023-05-22

## 2023-05-25 ENCOUNTER — FORM ENCOUNTER (OUTPATIENT)
Age: 58
End: 2023-05-25

## 2023-06-01 ENCOUNTER — NON-APPOINTMENT (OUTPATIENT)
Age: 58
End: 2023-06-01

## 2023-07-05 ENCOUNTER — APPOINTMENT (OUTPATIENT)
Dept: PAIN MANAGEMENT | Facility: CLINIC | Age: 58
End: 2023-07-05
Payer: OTHER MISCELLANEOUS

## 2023-07-05 VITALS — HEIGHT: 66 IN | WEIGHT: 189 LBS | BODY MASS INDEX: 30.37 KG/M2

## 2023-07-05 DIAGNOSIS — M47.816 SPONDYLOSIS W/OUT MYELOPATHY OR RADICULOPATHY, LUMBAR REGION: ICD-10-CM

## 2023-07-05 PROCEDURE — 99215 OFFICE O/P EST HI 40 MIN: CPT

## 2023-07-05 RX ORDER — TIZANIDINE 4 MG/1
4 TABLET ORAL
Qty: 30 | Refills: 3 | Status: ACTIVE | COMMUNITY
Start: 2023-01-18 | End: 1900-01-01

## 2023-07-05 NOTE — HISTORY OF PRESENT ILLNESS
[FreeTextEntry1] : HISTORY OF PRESENT ILLNESS, ORIGINAL: Mr. Mobley is a 55 year old male complaining of lower back, neck pain. The pain started after a work related injury. Today, his pain is mainly axial and worse with extension and associated with stiffness and range of motion difficulties. He denies any true radicular symptoms. He has done extensive therapy with minimal relief. The patient has had this pain for 5 months, with pain worsening over the past 18 days. Patient describes the pain as moderate to severe, being a 10/10 on the pain scale. During the last month the pain has been constant with symptoms worsening no typical pattern. Pain described as shooting, sharp, dull/aching. Pain is associated with numbness/pins and needles into the bilateral lower extremities. Patient has weakness in the bilateral upper and lower extremities. Pain is increased with lying down, standing, walking, exercising. Pain is decreased with relaxation. Pain is not changed with sitting, coughing/sneezing and bowel movements. Bowel or bladder habits have not changed.\par \par Of note, the patient takes a combination of tizanidine and gabapentin with about 30% relief.\par \par TODAY: Revisit encounter. \par \par He is presenting today with some residual lower back pain. He states the pain continues to radiate into the lower extremities. He notes associated numbness, tingling and spasms. He has trouble with standing or walking secondary to the pain. He currently rates his pain at a 7/10 on the pain scale. He is s/p lumbar caudal epidural steroid injection with minimal relief. His pain remains 9/10 in regards to his back pain traveling into the buttocks. He is frustrated by his symptoms. He has trouble sleeping. He has been using an aggressive home exercise program with no relief.\par \par He recently saw Dr. Campos who is planning on doing surgical correction.

## 2023-07-05 NOTE — ASSESSMENT
[FreeTextEntry1] : 58 year old male presenting with chronic lower back pain. He is awaiting surgical correction by Dr. Campos. He will follow up afterwards. Follow up in 6 weeks will be made for reassessment. I have explained the findings to the patient and all questions have been answered. \par \par \par Entered by Iva Cordova, acting as scribe for Dr. Hernandez.\par  \par The documentation recorded by the scribe, in my presence, accurately reflects the service I personally performed, and the decisions made by me with my edits as appropriate.\par  \par Best Regards, \par Lincoln Hernandez MD \par Board Certified, Anesthesiology \par Board Certified, Pain Medicine\par  \par

## 2023-07-05 NOTE — PHYSICAL EXAM
[de-identified] : Spine- tenderness into the lumbar paraspinals. ROM Restricted. Pain with flexion. SLR positive bilaterally.\par

## 2023-07-21 ENCOUNTER — APPOINTMENT (OUTPATIENT)
Dept: PULMONOLOGY | Facility: CLINIC | Age: 58
End: 2023-07-21
Payer: COMMERCIAL

## 2023-07-21 ENCOUNTER — LABORATORY RESULT (OUTPATIENT)
Age: 58
End: 2023-07-21

## 2023-07-21 VITALS
WEIGHT: 185 LBS | BODY MASS INDEX: 28.04 KG/M2 | HEIGHT: 68 IN | RESPIRATION RATE: 14 BRPM | SYSTOLIC BLOOD PRESSURE: 120 MMHG | OXYGEN SATURATION: 99 % | DIASTOLIC BLOOD PRESSURE: 80 MMHG | HEART RATE: 71 BPM

## 2023-07-21 DIAGNOSIS — J45.909 UNSPECIFIED ASTHMA, UNCOMPLICATED: ICD-10-CM

## 2023-07-21 DIAGNOSIS — R09.82 POSTNASAL DRIP: ICD-10-CM

## 2023-07-21 DIAGNOSIS — K44.9 DIAPHRAGMATIC HERNIA W/OUT OBSTRUCTION OR GANGRENE: ICD-10-CM

## 2023-07-21 PROCEDURE — 99213 OFFICE O/P EST LOW 20 MIN: CPT

## 2023-07-21 RX ORDER — AZELASTINE HYDROCHLORIDE 137 UG/1
0.1 SPRAY, METERED NASAL
Qty: 30 | Refills: 5 | Status: ACTIVE | COMMUNITY
Start: 2023-07-21 | End: 1900-01-01

## 2023-07-21 NOTE — DISCUSSION/SUMMARY
[FreeTextEntry1] : SOB AT REST ??\par PND\par REMOTE HX OF ASTHMA AS NEEDED INHALERS\par NEEDS PFT\par GLADIS / HST REVIEWED

## 2023-08-16 ENCOUNTER — APPOINTMENT (OUTPATIENT)
Dept: PAIN MANAGEMENT | Facility: CLINIC | Age: 58
End: 2023-08-16

## 2023-09-19 RX ORDER — GABAPENTIN 600 MG/1
600 TABLET, COATED ORAL
Qty: 90 | Refills: 3 | Status: ACTIVE | COMMUNITY
Start: 2023-01-18 | End: 1900-01-01

## 2023-09-25 ENCOUNTER — RX RENEWAL (OUTPATIENT)
Age: 58
End: 2023-09-25

## 2023-09-25 RX ORDER — IBUPROFEN 600 MG/1
600 TABLET, FILM COATED ORAL TWICE DAILY
Qty: 60 | Refills: 0 | Status: ACTIVE | COMMUNITY
Start: 2023-01-18 | End: 1900-01-01

## 2023-09-28 ENCOUNTER — OUTPATIENT (OUTPATIENT)
Dept: OUTPATIENT SERVICES | Facility: HOSPITAL | Age: 58
LOS: 1 days | End: 2023-09-28
Payer: COMMERCIAL

## 2023-09-28 VITALS
SYSTOLIC BLOOD PRESSURE: 150 MMHG | HEART RATE: 65 BPM | OXYGEN SATURATION: 96 % | HEIGHT: 70 IN | WEIGHT: 179.02 LBS | TEMPERATURE: 97 F | DIASTOLIC BLOOD PRESSURE: 82 MMHG | RESPIRATION RATE: 18 BRPM

## 2023-09-28 DIAGNOSIS — Z98.89 OTHER SPECIFIED POSTPROCEDURAL STATES: Chronic | ICD-10-CM

## 2023-09-28 DIAGNOSIS — Z87.19 PERSONAL HISTORY OF OTHER DISEASES OF THE DIGESTIVE SYSTEM: Chronic | ICD-10-CM

## 2023-09-28 DIAGNOSIS — M48.062 SPINAL STENOSIS, LUMBAR REGION WITH NEUROGENIC CLAUDICATION: ICD-10-CM

## 2023-09-28 DIAGNOSIS — Z87.81 PERSONAL HISTORY OF (HEALED) TRAUMATIC FRACTURE: Chronic | ICD-10-CM

## 2023-09-28 DIAGNOSIS — Z01.818 ENCOUNTER FOR OTHER PREPROCEDURAL EXAMINATION: ICD-10-CM

## 2023-09-28 LAB
ALBUMIN SERPL ELPH-MCNC: 4.6 G/DL — SIGNIFICANT CHANGE UP (ref 3.5–5.2)
ALP SERPL-CCNC: 61 U/L — SIGNIFICANT CHANGE UP (ref 30–115)
ALT FLD-CCNC: 34 U/L — SIGNIFICANT CHANGE UP (ref 0–41)
ANION GAP SERPL CALC-SCNC: 12 MMOL/L — SIGNIFICANT CHANGE UP (ref 7–14)
APTT BLD: 28.7 SEC — SIGNIFICANT CHANGE UP (ref 27–39.2)
AST SERPL-CCNC: 23 U/L — SIGNIFICANT CHANGE UP (ref 0–41)
BASOPHILS # BLD AUTO: 0.04 K/UL — SIGNIFICANT CHANGE UP (ref 0–0.2)
BASOPHILS NFR BLD AUTO: 0.9 % — SIGNIFICANT CHANGE UP (ref 0–1)
BILIRUB SERPL-MCNC: 0.4 MG/DL — SIGNIFICANT CHANGE UP (ref 0.2–1.2)
BUN SERPL-MCNC: 19 MG/DL — SIGNIFICANT CHANGE UP (ref 10–20)
CALCIUM SERPL-MCNC: 9.4 MG/DL — SIGNIFICANT CHANGE UP (ref 8.4–10.5)
CHLORIDE SERPL-SCNC: 101 MMOL/L — SIGNIFICANT CHANGE UP (ref 98–110)
CO2 SERPL-SCNC: 25 MMOL/L — SIGNIFICANT CHANGE UP (ref 17–32)
CREAT SERPL-MCNC: 0.9 MG/DL — SIGNIFICANT CHANGE UP (ref 0.7–1.5)
EGFR: 100 ML/MIN/1.73M2 — SIGNIFICANT CHANGE UP
EOSINOPHIL # BLD AUTO: 0.21 K/UL — SIGNIFICANT CHANGE UP (ref 0–0.7)
EOSINOPHIL NFR BLD AUTO: 4.5 % — SIGNIFICANT CHANGE UP (ref 0–8)
GLUCOSE SERPL-MCNC: 104 MG/DL — HIGH (ref 70–99)
HCT VFR BLD CALC: 40 % — LOW (ref 42–52)
HGB BLD-MCNC: 14.4 G/DL — SIGNIFICANT CHANGE UP (ref 14–18)
IMM GRANULOCYTES NFR BLD AUTO: 0.2 % — SIGNIFICANT CHANGE UP (ref 0.1–0.3)
INR BLD: 0.96 RATIO — SIGNIFICANT CHANGE UP (ref 0.65–1.3)
LYMPHOCYTES # BLD AUTO: 1.7 K/UL — SIGNIFICANT CHANGE UP (ref 1.2–3.4)
LYMPHOCYTES # BLD AUTO: 36.8 % — SIGNIFICANT CHANGE UP (ref 20.5–51.1)
MCHC RBC-ENTMCNC: 34.3 PG — HIGH (ref 27–31)
MCHC RBC-ENTMCNC: 36 G/DL — SIGNIFICANT CHANGE UP (ref 32–37)
MCV RBC AUTO: 95.2 FL — HIGH (ref 80–94)
MONOCYTES # BLD AUTO: 0.58 K/UL — SIGNIFICANT CHANGE UP (ref 0.1–0.6)
MONOCYTES NFR BLD AUTO: 12.6 % — HIGH (ref 1.7–9.3)
MRSA PCR RESULT.: NEGATIVE — SIGNIFICANT CHANGE UP
NEUTROPHILS # BLD AUTO: 2.08 K/UL — SIGNIFICANT CHANGE UP (ref 1.4–6.5)
NEUTROPHILS NFR BLD AUTO: 45 % — SIGNIFICANT CHANGE UP (ref 42.2–75.2)
NRBC # BLD: 0 /100 WBCS — SIGNIFICANT CHANGE UP (ref 0–0)
PLATELET # BLD AUTO: 258 K/UL — SIGNIFICANT CHANGE UP (ref 130–400)
PMV BLD: 9.9 FL — SIGNIFICANT CHANGE UP (ref 7.4–10.4)
POTASSIUM SERPL-MCNC: 4.4 MMOL/L — SIGNIFICANT CHANGE UP (ref 3.5–5)
POTASSIUM SERPL-SCNC: 4.4 MMOL/L — SIGNIFICANT CHANGE UP (ref 3.5–5)
PROT SERPL-MCNC: 7 G/DL — SIGNIFICANT CHANGE UP (ref 6–8)
PROTHROM AB SERPL-ACNC: 10.9 SEC — SIGNIFICANT CHANGE UP (ref 9.95–12.87)
RBC # BLD: 4.2 M/UL — LOW (ref 4.7–6.1)
RBC # FLD: 11.9 % — SIGNIFICANT CHANGE UP (ref 11.5–14.5)
SODIUM SERPL-SCNC: 138 MMOL/L — SIGNIFICANT CHANGE UP (ref 135–146)
WBC # BLD: 4.62 K/UL — LOW (ref 4.8–10.8)
WBC # FLD AUTO: 4.62 K/UL — LOW (ref 4.8–10.8)

## 2023-09-28 PROCEDURE — 87641 MR-STAPH DNA AMP PROBE: CPT

## 2023-09-28 PROCEDURE — 85610 PROTHROMBIN TIME: CPT

## 2023-09-28 PROCEDURE — 86900 BLOOD TYPING SEROLOGIC ABO: CPT

## 2023-09-28 PROCEDURE — 93005 ELECTROCARDIOGRAM TRACING: CPT

## 2023-09-28 PROCEDURE — 86901 BLOOD TYPING SEROLOGIC RH(D): CPT

## 2023-09-28 PROCEDURE — 93010 ELECTROCARDIOGRAM REPORT: CPT

## 2023-09-28 PROCEDURE — 36415 COLL VENOUS BLD VENIPUNCTURE: CPT

## 2023-09-28 PROCEDURE — 85730 THROMBOPLASTIN TIME PARTIAL: CPT

## 2023-09-28 PROCEDURE — 87640 STAPH A DNA AMP PROBE: CPT

## 2023-09-28 PROCEDURE — 80053 COMPREHEN METABOLIC PANEL: CPT

## 2023-09-28 PROCEDURE — 99214 OFFICE O/P EST MOD 30 MIN: CPT | Mod: 25

## 2023-09-28 PROCEDURE — 86850 RBC ANTIBODY SCREEN: CPT

## 2023-09-28 PROCEDURE — 85025 COMPLETE CBC W/AUTO DIFF WBC: CPT

## 2023-09-28 RX ORDER — GABAPENTIN 400 MG/1
0 CAPSULE ORAL
Qty: 0 | Refills: 0 | DISCHARGE

## 2023-09-28 NOTE — H&P PST ADULT - HISTORY OF PRESENT ILLNESS
PATIENT CURRENTLY DENIES CHEST PAIN  SHORTNESS OF BREATH  PALPITATIONS,  DYSURIA, OR UPPER RESPIRATORY INFECTION IN PAST 2 WEEKS      Denies travel outside the [place] in the past 30 days  Patient denies any signs or symptoms of COVID 19 and denies contact with known positive individuals.   PT TESTED POSITIVE FOR COVID 2 WEEKS AGO  Patient reports receiving covid vaccine x 4 doses    Anesthesia Alert  YES--Difficult Airway CLASS IV  NO--History of neck surgery or radiation  NO--Limited ROM of neck  NO--History of Malignant hyperthermia  NO--No personal or family history of Pseudocholinesterase deficiency.  NO--Prior Anesthesia Complication  NO--Latex Allergy  NO--Loose teeth FULL DENTURES  NO--History of Rheumatoid Arthritis  NO--Bleeding risk  NO--GLADIS  NO--Other_____    Revised Cardiac Risk Index for Pre-Operative Risk   RESULT SUMMARY:  0 points  Class I Risk    3.9 %  30-day risk of death, MI, or cardiac arrest  INPUTS:  Elevated-risk surgery —> 0 = No  History of ischemic heart disease —> 0 = No  History of congestive heart failure —> 0 = No  History of cerebrovascular disease —> 0 = No  Pre-operative treatment with insulin —> 0 = No  Pre-operative creatinine >2 mg/dL / 176.8 µmol/L —> 0 = No    [facility] Activity Status Index (DASI) PT ACTIVITY LEVEL IS LIMITED BY BACK PAIN  RESULT SUMMARY:  28.7 points  The higher the score (maximum 58.2), the higher the functional status.    6.27 METs  INPUTS:  Take care of self —> 2.75 = Yes  Walk indoors —> 1.75 = Yes  Walk 1&ndash;2 blocks on level ground —> 2.75 = Yes  Climb a flight of stairs or walk up a hill —> 5.5 = Yes  Run a short distance —> 0 = No  Do light work around the house —> 2.7 = Yes  Do moderate work around the house —> 3.5 = Yes  Do heavy work around the house —> 0 = No  Do yardwork —> 4.5 = Yes  Have sexual relations —> 5.25 = Yes  Participate in moderate recreational activities —> 0 = No  Participate in strenuous sports —> 0 = No            PT DENIES ANY RASHES, ABRASION, OR OPEN WOUNDS OR CUTS    Opioid Risk Assessment Tool (Male)       Family history of substance abuse- no             Alcohol (3)            Illegal Drugs (3)            Prescription drugs (4)       Personal history of substance abuse- no            Alcohol (3)            Illegal Drugs (4)            Prescription drugs (5)       Age between 16-45 (1)- no       History of preadolescent sexual abuse (0)- no       Psychological disease (ADD, ADHD, OCD, Bipolar Disorder, Schizophrenia, Depression) (1)-1 depression   Scoring Totals:  Low Risk (0-3)  Moderate Risk (4-7)  High Risk (>/=8)- Low Risk 1      AS PER THE PT, THIS IS HIS/HER COMPLETE MEDICAL AND SURGICAL HX, INCLUDING MEDICATIONS PRESCRIBED AND OVER THE COUNTER    Patient verbalized understanding of instructions and was given the opportunity to ask questions and have them answered.    pt denies any suicidal ideation or thoughts, pt states not a threat to self or others

## 2023-09-28 NOTE — H&P PST ADULT - NSICDXPASTMEDICALHX_GEN_ALL_CORE_FT
PAST MEDICAL HISTORY:  Acid reflux disease     Allergic rhinitis, unspecified allergic rhinitis type     Asthma     Back pain     HLD (hyperlipidemia)     HTN (hypertension)
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
## 2023-09-28 NOTE — H&P PST ADULT - ATTENDING COMMENTS
Patient seen and examined  Last minute questions answered  Discussed dural tears  informed consent signed proceed to OR

## 2023-09-28 NOTE — H&P PST ADULT - NSICDXPASTSURGICALHX_GEN_ALL_CORE_FT
Problem: Falls - Risk of  Goal: *Absence of Falls  Description: Document Danny Da Silva Fall Risk and appropriate interventions in the flowsheet.   1/31/2023 2052 by Janeth Koch RN  Outcome: Progressing Towards Goal  Note: Fall Risk Interventions:                             1/31/2023 0946 by Janeth Koch RN  Outcome: Progressing Towards Goal  Note: Fall Risk Interventions:                                Problem: Patient Education: Go to Patient Education Activity  Goal: Patient/Family Education  1/31/2023 2052 by Janeth Koch RN  Outcome: Progressing Towards Goal  1/31/2023 0946 by Janeth Koch RN  Outcome: Progressing Towards Goal     Problem: Patient Education: Go to Patient Education Activity  Goal: Patient/Family Education  1/31/2023 2052 by Janeth Koch RN  Outcome: Progressing Towards Goal  1/31/2023 0946 by Janeth Koch RN  Outcome: Progressing Towards Goal     Problem: Discharge Planning  Goal: *Discharge to safe environment  Outcome: Progressing Towards Goal     Problem: Patient Education: Go to Patient Education Activity  Goal: Patient/Family Education  Outcome: Progressing Towards Goal PAST SURGICAL HISTORY:  H/O hiatal hernia mom    History of broken nose     History of hernia surgery incarcerated inguinal hernia 5 years ago    History of right knee surgery minescus repair

## 2023-09-29 DIAGNOSIS — M48.062 SPINAL STENOSIS, LUMBAR REGION WITH NEUROGENIC CLAUDICATION: ICD-10-CM

## 2023-09-29 DIAGNOSIS — Z01.818 ENCOUNTER FOR OTHER PREPROCEDURAL EXAMINATION: ICD-10-CM

## 2023-10-23 NOTE — ASU PATIENT PROFILE, ADULT - HEALTHCARE QUESTIONS, PROFILE
Administrative documentation to document COVID-19 status. COVID-19 status: Most recent Covid-19 test positive 11/18/20, in [place].
none

## 2023-10-24 ENCOUNTER — TRANSCRIPTION ENCOUNTER (OUTPATIENT)
Age: 58
End: 2023-10-24

## 2023-10-24 ENCOUNTER — APPOINTMENT (OUTPATIENT)
Dept: ORTHOPEDIC SURGERY | Facility: HOSPITAL | Age: 58
End: 2023-10-24

## 2023-10-24 ENCOUNTER — OUTPATIENT (OUTPATIENT)
Dept: OUTPATIENT SERVICES | Facility: HOSPITAL | Age: 58
LOS: 1 days | Discharge: HOME | End: 2023-10-24
Payer: COMMERCIAL

## 2023-10-24 VITALS
HEIGHT: 70 IN | RESPIRATION RATE: 16 BRPM | WEIGHT: 182.98 LBS | HEART RATE: 55 BPM | TEMPERATURE: 98 F | DIASTOLIC BLOOD PRESSURE: 90 MMHG | OXYGEN SATURATION: 97 % | SYSTOLIC BLOOD PRESSURE: 160 MMHG

## 2023-10-24 VITALS — RESPIRATION RATE: 20 BRPM | HEART RATE: 65 BPM | OXYGEN SATURATION: 94 %

## 2023-10-24 DIAGNOSIS — Z98.89 OTHER SPECIFIED POSTPROCEDURAL STATES: Chronic | ICD-10-CM

## 2023-10-24 DIAGNOSIS — Z87.81 PERSONAL HISTORY OF (HEALED) TRAUMATIC FRACTURE: Chronic | ICD-10-CM

## 2023-10-24 DIAGNOSIS — M48.062 SPINAL STENOSIS, LUMBAR REGION WITH NEUROGENIC CLAUDICATION: ICD-10-CM

## 2023-10-24 DIAGNOSIS — Z87.19 PERSONAL HISTORY OF OTHER DISEASES OF THE DIGESTIVE SYSTEM: Chronic | ICD-10-CM

## 2023-10-24 PROCEDURE — 63047 LAM FACETEC & FORAMOT LUMBAR: CPT

## 2023-10-24 PROCEDURE — 72100 X-RAY EXAM L-S SPINE 2/3 VWS: CPT

## 2023-10-24 PROCEDURE — C9399: CPT

## 2023-10-24 PROCEDURE — C1889: CPT

## 2023-10-24 RX ORDER — FLUTICASONE PROPIONATE 50 MCG
1 SPRAY, SUSPENSION NASAL
Refills: 0 | DISCHARGE

## 2023-10-24 RX ORDER — CYCLOBENZAPRINE HYDROCHLORIDE 5 MG/1
5 TABLET, FILM COATED ORAL 3 TIMES DAILY
Qty: 30 | Refills: 0 | Status: ACTIVE | COMMUNITY
Start: 2023-10-24 | End: 1900-01-01

## 2023-10-24 RX ORDER — LOSARTAN POTASSIUM 100 MG/1
1 TABLET, FILM COATED ORAL
Refills: 0 | DISCHARGE

## 2023-10-24 RX ORDER — ATORVASTATIN CALCIUM 80 MG/1
1 TABLET, FILM COATED ORAL
Qty: 0 | Refills: 0 | DISCHARGE

## 2023-10-24 RX ORDER — TIZANIDINE 4 MG/1
0 TABLET ORAL
Refills: 0 | DISCHARGE

## 2023-10-24 RX ORDER — IBUPROFEN 200 MG
1 TABLET ORAL
Refills: 0 | DISCHARGE

## 2023-10-24 RX ORDER — MONTELUKAST 4 MG/1
1 TABLET, CHEWABLE ORAL
Refills: 0 | DISCHARGE

## 2023-10-24 RX ORDER — OMEPRAZOLE 10 MG/1
1 CAPSULE, DELAYED RELEASE ORAL
Refills: 0 | DISCHARGE

## 2023-10-24 RX ORDER — APREPITANT 80 MG/1
40 CAPSULE ORAL ONCE
Refills: 0 | Status: COMPLETED | OUTPATIENT
Start: 2023-10-24 | End: 2023-10-24

## 2023-10-24 RX ORDER — OXYCODONE 5 MG/1
5 TABLET ORAL
Qty: 20 | Refills: 0 | Status: ACTIVE | COMMUNITY
Start: 2023-10-24 | End: 1900-01-01

## 2023-10-24 RX ORDER — HYDROMORPHONE HYDROCHLORIDE 2 MG/ML
0.5 INJECTION INTRAMUSCULAR; INTRAVENOUS; SUBCUTANEOUS
Refills: 0 | Status: DISCONTINUED | OUTPATIENT
Start: 2023-10-24 | End: 2023-10-24

## 2023-10-24 RX ORDER — GABAPENTIN 400 MG/1
1 CAPSULE ORAL
Refills: 0 | DISCHARGE

## 2023-10-24 RX ORDER — SODIUM CHLORIDE 9 MG/ML
1000 INJECTION, SOLUTION INTRAVENOUS
Refills: 0 | Status: DISCONTINUED | OUTPATIENT
Start: 2023-10-24 | End: 2023-10-24

## 2023-10-24 RX ORDER — AZELASTINE 137 UG/1
2 SPRAY, METERED NASAL
Refills: 0 | DISCHARGE

## 2023-10-24 RX ADMIN — APREPITANT 40 MILLIGRAM(S): 80 CAPSULE ORAL at 08:55

## 2023-10-24 RX ADMIN — HYDROMORPHONE HYDROCHLORIDE 0.5 MILLIGRAM(S): 2 INJECTION INTRAMUSCULAR; INTRAVENOUS; SUBCUTANEOUS at 12:50

## 2023-10-24 NOTE — ASU PREOP CHECKLIST - NSWEIGHTCALCTOOLDRUG_GEN_A_CORE
After Your Colonoscopy Instructions    DIET:  · Resume your usual diet.    ACTIVITY:  · Rest quietly at home for the remainder of the day. You may resume normal activities tomorrow.  · Do not do anything that requires coordination the day of the procedure, such as climbing ladders, using a sharp knife, operating machinery, driving.   · May resume driving after 24 hours  · May shower tomorrow  · Follow  post anesthesia sedation information sheet.(given)  · Follow For Your Well Being Safety (given)     WHAT TO EXPECT:  · Mild abdominal discomfort, bloating and gas pains.  · A scant amount of rectal bleeding following a biopsy, polypectomy or if you have hemorrhoids is normal.  · Return of your usual bowel movements in 1-2 days.  · A tired feeling after the procedure due to the medications given to help you relax.       PROBLEMS TO WATCH FOR - NOTIFY YOUR SURGEON IF YOU HAVE ANY OF THESE SYMPTOMS:  · Severe abdominal pain or bloating.   · Chills or temperature over 101 degrees.  · Large amounts of bright red rectal bleeding, blood clots or black colored stool.  · Vomiting blood or black colored liquid.    FOLLOW -UP:  · If a specimen was taken, please allow at least 7-10  business days for pathology results.  · Someone will either call or send a letter with your pathology results.      If you have not received a letter or phone call, or have any questions or concerns, please call   Dr. Lagunas at  [phone] or [phone]      
 used

## 2023-10-24 NOTE — ASU DISCHARGE PLAN (ADULT/PEDIATRIC) - CARE PROVIDER_API CALL
Dean Vidal  Orthopaedic Surgery  [address] [place] [address]  Phone: [phone]  Fax: [phone]  Follow Up Time:

## 2023-10-24 NOTE — ASU DISCHARGE PLAN (ADULT/PEDIATRIC) - NS MD DC FALL RISK RISK
For information on Fall & Injury Prevention, visit: [email or web address] OR  [email or web address] OR  https://www.cdc.gov/steadi/patient.html

## 2023-10-24 NOTE — CHART NOTE - NSCHARTNOTEFT_GEN_A_CORE
PACU ANESTHESIA ADMISSION NOTE      Procedure: Lumbar laminectomy      Post op diagnosis:  Lumbar stenosis        ____  Intubated  TV:______       Rate: ______      FiO2: ______    __x__  Patent Airway    __x__  Full return of protective reflexes    __x__  Full recovery from anesthesia / back to baseline status      Mental Status:  __x__ Awake   ___x__ Alert   _____ Drowsy   _____ Sedated    Nausea/Vomiting:  __x__ NO  ______Yes,   See Post - Op Orders          Pain Scale (0-10):  __0___    Treatment: ____ None    __x__ See Post - Op/PCA Orders    Post - Operative Fluids:   ____ Oral   __x__ See Post - Op Orders    Plan: Discharge:   __x__Home       _____Floor     _____Critical Care    _____  Other:_________________    Comments: Patient had smooth intraoperative event, no anesthesia complication.      PACU Vital signs: HR:   82         BP:   160     /   83       RR:     16   O2 Sat:  98     %     Temp 97.6

## 2023-10-27 ENCOUNTER — APPOINTMENT (OUTPATIENT)
Dept: ORTHOPEDIC SURGERY | Facility: CLINIC | Age: 58
End: 2023-10-27
Payer: OTHER MISCELLANEOUS

## 2023-10-27 PROBLEM — J45.909 UNSPECIFIED ASTHMA, UNCOMPLICATED: Chronic | Status: ACTIVE | Noted: 2023-09-28

## 2023-10-27 PROBLEM — M54.9 DORSALGIA, UNSPECIFIED: Chronic | Status: ACTIVE | Noted: 2023-09-28

## 2023-10-27 PROCEDURE — 99024 POSTOP FOLLOW-UP VISIT: CPT

## 2023-10-28 DIAGNOSIS — M54.16 RADICULOPATHY, LUMBAR REGION: ICD-10-CM

## 2023-10-28 DIAGNOSIS — E78.00 PURE HYPERCHOLESTEROLEMIA, UNSPECIFIED: ICD-10-CM

## 2023-10-28 DIAGNOSIS — M48.062 SPINAL STENOSIS, LUMBAR REGION WITH NEUROGENIC CLAUDICATION: ICD-10-CM

## 2023-10-28 DIAGNOSIS — K21.9 GASTRO-ESOPHAGEAL REFLUX DISEASE WITHOUT ESOPHAGITIS: ICD-10-CM

## 2023-10-28 DIAGNOSIS — J45.909 UNSPECIFIED ASTHMA, UNCOMPLICATED: ICD-10-CM

## 2023-10-28 DIAGNOSIS — Z88.5 ALLERGY STATUS TO NARCOTIC AGENT: ICD-10-CM

## 2023-10-28 DIAGNOSIS — Z87.891 PERSONAL HISTORY OF NICOTINE DEPENDENCE: ICD-10-CM

## 2023-10-28 DIAGNOSIS — I10 ESSENTIAL (PRIMARY) HYPERTENSION: ICD-10-CM

## 2023-11-02 ENCOUNTER — APPOINTMENT (OUTPATIENT)
Dept: CARDIOLOGY | Facility: CLINIC | Age: 58
End: 2023-11-02
Payer: COMMERCIAL

## 2023-11-02 VITALS
DIASTOLIC BLOOD PRESSURE: 78 MMHG | HEIGHT: 68 IN | BODY MASS INDEX: 28.04 KG/M2 | SYSTOLIC BLOOD PRESSURE: 122 MMHG | WEIGHT: 185 LBS | HEART RATE: 61 BPM

## 2023-11-02 DIAGNOSIS — R00.2 PALPITATIONS: ICD-10-CM

## 2023-11-02 DIAGNOSIS — G47.33 OBSTRUCTIVE SLEEP APNEA (ADULT) (PEDIATRIC): ICD-10-CM

## 2023-11-02 PROCEDURE — 93000 ELECTROCARDIOGRAM COMPLETE: CPT

## 2023-11-02 PROCEDURE — 99214 OFFICE O/P EST MOD 30 MIN: CPT | Mod: 25

## 2023-11-08 ENCOUNTER — APPOINTMENT (OUTPATIENT)
Dept: ORTHOPEDIC SURGERY | Facility: CLINIC | Age: 58
End: 2023-11-08
Payer: OTHER MISCELLANEOUS

## 2023-11-08 PROCEDURE — 99024 POSTOP FOLLOW-UP VISIT: CPT

## 2023-11-08 RX ORDER — CEPHALEXIN 500 MG/1
500 TABLET ORAL 3 TIMES DAILY
Qty: 15 | Refills: 0 | Status: ACTIVE | COMMUNITY
Start: 2023-11-08 | End: 1900-01-01

## 2023-11-13 ENCOUNTER — APPOINTMENT (OUTPATIENT)
Dept: ORTHOPEDIC SURGERY | Facility: CLINIC | Age: 58
End: 2023-11-13
Payer: OTHER MISCELLANEOUS

## 2023-11-13 PROCEDURE — 99024 POSTOP FOLLOW-UP VISIT: CPT

## 2023-11-21 ENCOUNTER — EMERGENCY (EMERGENCY)
Facility: HOSPITAL | Age: 58
LOS: 0 days | Discharge: HOME | End: 2023-11-21
Attending: EMERGENCY MEDICINE
Payer: COMMERCIAL

## 2023-11-21 VITALS
HEIGHT: 70 IN | TEMPERATURE: 98 F | WEIGHT: 182.1 LBS | RESPIRATION RATE: 18 BRPM | HEART RATE: 75 BPM | SYSTOLIC BLOOD PRESSURE: 170 MMHG | OXYGEN SATURATION: 98 % | DIASTOLIC BLOOD PRESSURE: 90 MMHG

## 2023-11-21 DIAGNOSIS — I10 ESSENTIAL (PRIMARY) HYPERTENSION: ICD-10-CM

## 2023-11-21 DIAGNOSIS — J45.909 UNSPECIFIED ASTHMA, UNCOMPLICATED: ICD-10-CM

## 2023-11-21 DIAGNOSIS — Z87.19 PERSONAL HISTORY OF OTHER DISEASES OF THE DIGESTIVE SYSTEM: ICD-10-CM

## 2023-11-21 DIAGNOSIS — Z88.6 ALLERGY STATUS TO ANALGESIC AGENT: ICD-10-CM

## 2023-11-21 DIAGNOSIS — Z98.89 OTHER SPECIFIED POSTPROCEDURAL STATES: Chronic | ICD-10-CM

## 2023-11-21 DIAGNOSIS — Z87.19 PERSONAL HISTORY OF OTHER DISEASES OF THE DIGESTIVE SYSTEM: Chronic | ICD-10-CM

## 2023-11-21 DIAGNOSIS — E78.5 HYPERLIPIDEMIA, UNSPECIFIED: ICD-10-CM

## 2023-11-21 DIAGNOSIS — M54.50 LOW BACK PAIN, UNSPECIFIED: ICD-10-CM

## 2023-11-21 DIAGNOSIS — Z87.81 PERSONAL HISTORY OF (HEALED) TRAUMATIC FRACTURE: Chronic | ICD-10-CM

## 2023-11-21 DIAGNOSIS — Z87.39 PERSONAL HISTORY OF OTHER DISEASES OF THE MUSCULOSKELETAL SYSTEM AND CONNECTIVE TISSUE: ICD-10-CM
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
## 2023-11-21 DIAGNOSIS — Z88.5 ALLERGY STATUS TO NARCOTIC AGENT: ICD-10-CM
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
## 2023-11-21 PROCEDURE — 99284 EMERGENCY DEPT VISIT MOD MDM: CPT

## 2023-11-21 PROCEDURE — 72131 CT LUMBAR SPINE W/O DYE: CPT | Mod: 26,MA

## 2023-11-21 PROCEDURE — 72192 CT PELVIS W/O DYE: CPT | Mod: 26,MA

## 2023-11-21 PROCEDURE — 99242 OFF/OP CONSLTJ NEW/EST SF 20: CPT

## 2023-11-21 PROCEDURE — 72131 CT LUMBAR SPINE W/O DYE: CPT | Mod: MA

## 2023-11-21 PROCEDURE — 72192 CT PELVIS W/O DYE: CPT | Mod: MA

## 2023-11-21 PROCEDURE — 99284 EMERGENCY DEPT VISIT MOD MDM: CPT | Mod: 25

## 2023-11-21 RX ORDER — LIDOCAINE 4 G/100G
1 CREAM TOPICAL ONCE
Refills: 0 | Status: COMPLETED | OUTPATIENT
Start: 2023-11-21 | End: 2023-11-21

## 2023-11-21 RX ORDER — DEXAMETHASONE 0.5 MG/5ML
10 ELIXIR ORAL ONCE
Refills: 0 | Status: COMPLETED | OUTPATIENT
Start: 2023-11-21 | End: 2023-11-21

## 2023-11-21 RX ADMIN — Medication 10 MILLIGRAM(S): at 18:14

## 2023-11-21 RX ADMIN — LIDOCAINE 1 PATCH: 4 CREAM TOPICAL at 18:15

## 2023-11-21 NOTE — ASU DISCHARGE PLAN (ADULT/PEDIATRIC) - CONDITION AT DISCHARGE
Chief Complaint:    Chief Complaint   Patient presents with   • Office Visit     Low back pain        History of Present Illness:  Chauncey Burroughs is a 65 year old male who presents with chief complaint of Office Visit (Low back pain)        65-year-old male complex medical and surgical history with failed back syndrome and multiple spine surgery most recent T10-L3 revision fusion with instrumentation.  Patient reports recent evaluation by Dr. Xiong, initially scheduled for revision procedure, however canceled.  Also history of urgent cardiac bypass.  Reports continued back pain.    Pain at rest: 7  Pain with activity: 10    Review of Systems:   GENERAL:  Patient denies fever, chills, tiredness, malaise.  EYES:  Patient denies blurred vision, double vision, pain, burning and itching.   IMMUNOLOGIC:  Patient denies hayfever, drug allergies.  NEUROLOGIC: Patient denies symptoms except as described in the HPI.   ENDOCRINE:  Patient denies excessive thirst, heat intolerance, lymph node  enlargement.  GASTROINTESTINAL:  Patient denies abdominal pain, nausea/vomiting,  indigestion/heartburn, diarrhea, constipation.  CARDIOVASCULAR:  Patient denies chest pain, varicose veins, high blood pressure.  SKIN:  Patient denies skin rashes, boils, persistent itching, acne.  MUSCULOSKELETAL: As above.  ENT/MOUTH:  Patient denies ear infections, sore throats, sinus problems, hearing loss.  :  Patient denies urine retention, painful urination, urinary frequency, blood in urine,  nocturia.  RESPIRATORY:  Patient denies wheezing, frequent cough, shortness of breath.    A complete Review of Systems was completed, all others negative.    Physical Exam:    Vitals:   There were no vitals taken for this visit.   General: Well developed, nourished, Obese body habitus, comfortable appearing  Psych: Alert and Oriented x3, appropriate affect  Head: Normal cephalic, a-traumatic    ENT: Good definition,   Neuro: CN 2-12 in tact  Gait:   antalgic  Respiratory: Non-labored breathing,   Cardiovascular: No peripheral edema  Abdomen: Soft, NT/ND  Vascular:  palpable radial pulses  Lymphatic:  no axillary lymphadenopathy and no inguinal lymphadenopathy  Neck: no cervical LAD, trachea midline   Thoraco-Lumbar Spine/Pelvis: scoliosis    Motor:    Lower Extremity    Left Right   Iliopsoas 4/5 4/5   Quadriceps 4/5 4/5   Hamstrings 4/5 4/5   Tibialis anterior 4/5 4/5   Extensor hallucis longus 4/5 4/5   Gastrocnemeus/  soleus 4/5 4/5     Sensation:    Lower Extremity     Left Right   L1 Inguinal ligamnet decreased decreased   L2 Medial thigh decreased decreased   L3 Medial thigh decreased decreased   L4 Medial foot decreased decreased   L5 Web space big toe/2nd toe decreased decreased   S1 Lateral foot decreased decreased         Diagnostic Tests/Imaging:  X-rays lumbar spine obtained today full in scoliosis 12 reviewed with patient:  No sign of hardware failure.    Assessment and Medical Decision Makin year old male with The primary encounter diagnosis was Failed back syndrome. A diagnosis of Failed back surgical syndrome was also pertinent to this visit.    65-year-old male with extensive history of spine surgery including multiple lumbar and thoracic laminectomies with T10-L3 fusion in 2019 with Dr. Boyer.  Noted to have pseudoarthrosis and hardware failure requiring revision T10-L3 instrumentation fusion with posterior based osteotomies performed by me on 2020.  Patient with significant lumbopelvic mismatch and flat back.  Posterior based osteotomies not sufficient enough to achieve correction given significant associated fusion mass throughout lumbar spine.  Patient also with history of extensive cervical spine surgery.  Recently evaluated by Dr. Xiong, initially scheduled for surgery, however canceled.  Reports continued back pain.  Recent CT scan with mild lucency 2 distal screws.  Discussed further surgery would be rather morbid and  unlikely to help with symptoms.  No gross hardware failure noted on x-rays.  Patient with baseline bowel and bladder issues, however reports worsening bowel and bladder issues with incontinence over the past several months.  Will obtain MRI to evaluate possible spine etiology of symptoms.  After MRIs reviewed, if no spine etiology of incontinence.  Patient will likely benefit from chronic pain management modalities.  As I agree with multiple surgeons who have evaluated including Dr. Eduardo, and Dr. Xiong.  Patient verbalized understanding.      Instructions:  Instructed the patient that in the event that symptoms change to contact the office or present to the local emergency room       Wero Clement MD   Stable
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
## 2023-11-21 NOTE — ED PROVIDER NOTE - CARE PROVIDER_API CALL
Dean Vidal  Orthopaedic Surgery  [address] [place] [address]  Phone: [phone]  Fax: [phone]  Follow Up Time: 7-10 Days

## 2023-11-21 NOTE — ED PROVIDER NOTE - PROGRESS NOTE DETAILS
ortho consulted on teams ortho evaluated pt in ED and recommends CT L spine and pelvis  noncontrast.
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
## 2023-11-21 NOTE — ED ADULT NURSE NOTE - CAS ELECT INFOMATION PROVIDED
[x]Patient and or caregiver verbalized understanding  []Patient and or Caregiver Demonstrated without assistance   []Patient and or Caregiver Demonstrated with assistance  []Needs additional instruction to demonstrate understanding of education  ASSESSMENT  Patient tolerated todays treatment session:    [x] Good   []  Fair   []  Poor  Limitations/difficulties with treatment session due to:   []Pain     []Fatigue     []Other medical complications     []Other  Goal Assessment: [] No Change    [x]Improved  Comments:  PLAN  [x]Continue with current plan of care  [][facility]  []IHold per patient request  [] Change Treatment plan:  [] Insurance hold  __ Other     TIME   Time Treatment session was INITIATED 5:30   Time Treatment session was STOPPED 5:50       Total TIMED minutes 30 min   Total UNTIMED minutes 0   Total TREATMENT minutes 30 min     Charges: speech [id number]  Electronically signed by:    Estelle Martin M.S., CCC/SLP                Date:8/23/2018 DC instructions

## 2023-11-21 NOTE — ED PROVIDER NOTE - PHYSICAL EXAMINATION
VITAL SIGNS: AFebrile, vital signs stable  CONSTITUTIONAL: Well-developed; well-nourished; in no acute distress.  SKIN: Skin exam is warm and dry, no acute rash.  HEAD: Normocephalic; atraumatic.  EYES: Pupils equal round reactive to light, Extraocular movements intact; conjunctiva and sclera clear.  ENT: No nasal discharge; airway clear. Moist mucus membranes.  NECK: Supple; non tender. No rigidity  CHEST : nontender  ABD: Abdomen soft; non-tender; non-distended;  no hepatosplenomegaly. No costovertebral angle tenderness.   EXT: Normal ROM. No clubbing, cyanosis or edema. No calf tenderness to palpation.   NEURO: Alert and Oriented x 3, Cranial nerves 2-12 intact, No nystagmus.  5/5 motor x 4 extremities, Sensation intact to light touch x 4 extremities, No facial droop or slurred speech.  No midline cervical/thoracic tenderness to palpation or step off. well healing scar to lumbar region with mild ttp, Normal gait, No ataxia.   PSYCH: Cooperative, appropriate.
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
## 2023-11-21 NOTE — ED ADULT NURSE NOTE - NSFALLASSESSNEED_ED_ALL_ED
Started amoxicillin today for tooth infection, now patient complains of rash/itching to left lower leg. Patient does feel weak as well. Hx of cellulitis in leg.  
no

## 2023-11-21 NOTE — ED PROVIDER NOTE - CLINICAL SUMMARY MEDICAL DECISION MAKING FREE TEXT BOX
ct scans no acute fractures, discussed L spine read with ortho resident- likely post op changes, no clinical suspicion for infection, dc home, f/u with his surgeon 1-2 weeks, strict return precautions provided.
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
## 2023-11-21 NOTE — ED PROVIDER NOTE - PATIENT PORTAL LINK FT
You can access the FollowMyHealth Patient Portal offered by [facility] by registering at the following website: [email or web address]. By joining [employer]’s FollowMyHealth portal, you will also be able to view your health information using other applications (apps) compatible with our system.

## 2023-12-22 ENCOUNTER — RX RENEWAL (OUTPATIENT)
Age: 58
End: 2023-12-22

## 2023-12-27 ENCOUNTER — APPOINTMENT (OUTPATIENT)
Dept: ORTHOPEDIC SURGERY | Facility: CLINIC | Age: 58
End: 2023-12-27
Payer: OTHER MISCELLANEOUS

## 2023-12-27 PROCEDURE — 99024 POSTOP FOLLOW-UP VISIT: CPT

## 2023-12-27 NOTE — IMAGING
[de-identified] : Incision healing appropriately no fluctuance no drainage no erythema no dehiscence

## 2023-12-27 NOTE — HISTORY OF PRESENT ILLNESS
[de-identified] : 58-year-old male presents for follow-up.  He is status post L2-L3 laminectomy on 10/24/2023.  He is doing well.  He has been in therapy.  He denies any claudicant radicular pain.  He has been walking a lot.  He has multiple vacations planned in the future.  Happy with his progress.  He did have some achiness in his back after he went for a very long walk the other day however it has since gotten better.

## 2023-12-27 NOTE — DISCUSSION/SUMMARY
[de-identified] : 58-year-old male presents to me status post L2-3 laminectomy for neurogenic claudication.  Patient is doing very well.  He is happy with his progress.  No leg pain.  His only complaint is stiffness and achiness in his back but he understands that he will have to deal with that and manage that in the future with physical therapy, exercises at home, no frequent walking.  He will follow-up with me in 3 months.

## 2024-01-03 ENCOUNTER — APPOINTMENT (OUTPATIENT)
Dept: ORTHOPEDIC SURGERY | Facility: CLINIC | Age: 59
End: 2024-01-03
Payer: OTHER MISCELLANEOUS

## 2024-01-03 DIAGNOSIS — M48.062 SPINAL STENOSIS, LUMBAR REGION WITH NEUROGENIC CLAUDICATION: ICD-10-CM

## 2024-01-03 PROCEDURE — 99024 POSTOP FOLLOW-UP VISIT: CPT

## 2024-01-03 NOTE — IMAGING
[de-identified] : Diminished range of motion in the lumbar spine with flexion and extension and this type of motion elicits pain.

## 2024-01-03 NOTE — HISTORY OF PRESENT ILLNESS
[de-identified] : 58-year-old male presents to me with forms to discuss.  The patient is status post L2-3 laminectomy on 1020 4/20/2023.  He is doing well in terms of the radicular and claudicant pain in his lower extremity.  He still has back stiffness.

## 2024-01-03 NOTE — DISCUSSION/SUMMARY
[de-identified] : 58-year-old male with lumbar stiffness and pain status post L2-3 laminectomy on 12/24/2023.  Patient's radicular claudicant pain has been resolved by the surgery.  I am advising physical therapy and continued exercising to strengthen and stretch the low back muscles.

## 2024-03-13 ENCOUNTER — APPOINTMENT (OUTPATIENT)
Dept: ORTHOPEDIC SURGERY | Facility: CLINIC | Age: 59
End: 2024-03-13
Payer: OTHER MISCELLANEOUS

## 2024-03-13 DIAGNOSIS — M54.16 RADICULOPATHY, LUMBAR REGION: ICD-10-CM

## 2024-03-13 PROCEDURE — 99213 OFFICE O/P EST LOW 20 MIN: CPT

## 2024-03-13 NOTE — HISTORY OF PRESENT ILLNESS
[de-identified] : 58-year-old male status post L2-3 laminectomy on 10/24/2022.  Patient is doing very well.  He is having no leg pain.  He is happy with his progress.  Sometimes he has some back pain and stiffness but he can live with it and understands that that is part of his life.  He takes meloxicam as needed.

## 2024-03-13 NOTE — DISCUSSION/SUMMARY
[de-identified] : 58-year-old male status post L2-3 laminectomy on 10/24/2023.  Patient is doing very well.  He is happy with his progress he continues to have persistent resolution of his radicular pain and claudicant pain.  He will see me in about 6 months which is about 1 year lai after surgery.

## 2024-05-01 ENCOUNTER — APPOINTMENT (OUTPATIENT)
Dept: CARDIOLOGY | Facility: CLINIC | Age: 59
End: 2024-05-01
Payer: COMMERCIAL

## 2024-05-01 VITALS
HEART RATE: 55 BPM | SYSTOLIC BLOOD PRESSURE: 126 MMHG | HEIGHT: 68 IN | BODY MASS INDEX: 27.58 KG/M2 | WEIGHT: 182 LBS | DIASTOLIC BLOOD PRESSURE: 90 MMHG

## 2024-05-01 DIAGNOSIS — K21.9 GASTRO-ESOPHAGEAL REFLUX DISEASE W/OUT ESOPHAGITIS: ICD-10-CM

## 2024-05-01 DIAGNOSIS — I10 ESSENTIAL (PRIMARY) HYPERTENSION: ICD-10-CM

## 2024-05-01 PROCEDURE — 99213 OFFICE O/P EST LOW 20 MIN: CPT

## 2024-05-01 NOTE — REASON FOR VISIT
[FreeTextEntry1] : chest pain  palpitations  dizziness.   [Initial Evaluation] : an initial evaluation of [FreeTextEntry2] : palps, vertigo and chest pain

## 2024-05-12 ENCOUNTER — RX RENEWAL (OUTPATIENT)
Age: 59
End: 2024-05-12

## 2024-05-12 RX ORDER — MELOXICAM 15 MG/1
15 TABLET ORAL
Qty: 30 | Refills: 1 | Status: ACTIVE | COMMUNITY
Start: 2023-10-24 | End: 1900-01-01

## 2024-06-13 NOTE — HISTORY OF PRESENT ILLNESS
[FreeTextEntry1] : 56M with PMH of HTN, HLD, asthma, GERD who presents for follow up of pruritus ani.  Since his last visit, his symptoms resolved but have recurrent.  His burning is very mild and intermittent. He has had He has 2-3 BM daily.   Patient denies fevers, chills, nausea, vomiting, abdominal pain, constipation, diarrhea, blood in his stool or unexpected weight loss.  Patient denies a family history of colon cancer rectal cancer or inflammatory bowel disease.  Patient's last colonoscopy was in 5/2019 and showed diverticulosis.
99

## 2024-09-11 ENCOUNTER — APPOINTMENT (OUTPATIENT)
Dept: ORTHOPEDIC SURGERY | Facility: CLINIC | Age: 59
End: 2024-09-11
Payer: OTHER MISCELLANEOUS

## 2024-09-11 DIAGNOSIS — M54.16 RADICULOPATHY, LUMBAR REGION: ICD-10-CM

## 2024-09-11 DIAGNOSIS — M54.12 RADICULOPATHY, CERVICAL REGION: ICD-10-CM

## 2024-09-11 PROCEDURE — 99214 OFFICE O/P EST MOD 30 MIN: CPT

## 2024-09-11 NOTE — DISCUSSION/SUMMARY
[de-identified] : 59-year-old male status post L2-3 laminectomy with resolution of his lumbar radiculopathy neurogenic claudication symptoms.  With regards to the patient cervical radiculopathy.  I am ordering MRI of cervical spine with regards to his low back pain he should continue conservative care including physical therapy and injections with Dr. Hernandez.  I will see him back after the MRI of the cervical spine.

## 2024-09-11 NOTE — HISTORY OF PRESENT ILLNESS
[de-identified] : 59-year-old male presents to me status post L2-3 laminectomy about 1 year ago on October 24, 2023.  Overall patient doing well.  He continues to have complete resolution of his radicular claudicant symptoms in his lower extremities.  He only complains of low back pain.  He is having injections with Dr. Hernandez for these.  He also has cervical pain.  This is related to his work-related accident.  Neck pain radiates down his arms and he has numbness in his hands.  No recent MRI of the cervical spine but he did get an EMG done.  He has had injections in the cervical spine as well with only partial relief.

## 2024-09-11 NOTE — IMAGING
[de-identified] : TTP midline cervical spine and paraspinal musculature   Strength                                                                     Deltoid   Right: 5/5; Left: 5/5                      Biceps   Right: 5/5; Left: 5/5                   Triceps        Right: 5/5; Left: 5/5                                 Wrist Extensors     Right: 5/5; Left: 5/5 Finger Flexors     Right: 5/5; Left: 5/5 IO    Right: 5/5; Left: 5/5  Sensation C5   Right: 2/2; Left: 2/2 C6   Right: 2/2; Left: 2/2 C7   Right: 2/2; Left: 2/2 C8   Right: 2/2; Left: 2/2 T1   Right: 2/2; Left: 2/2  Reflexes Biceps   Right: 2+; Left 2+ Triceps   Right: 2+; Left 2+ Stafford's  Right: Negative; L: Negative

## 2024-10-11 ENCOUNTER — APPOINTMENT (OUTPATIENT)
Dept: MRI IMAGING | Facility: CLINIC | Age: 59
End: 2024-10-11

## 2024-10-29 ENCOUNTER — NON-APPOINTMENT (OUTPATIENT)
Age: 59
End: 2024-10-29

## 2024-10-30 ENCOUNTER — NON-APPOINTMENT (OUTPATIENT)
Age: 59
End: 2024-10-30

## 2024-10-30 ENCOUNTER — APPOINTMENT (OUTPATIENT)
Dept: CARDIOLOGY | Facility: CLINIC | Age: 59
End: 2024-10-30
Payer: COMMERCIAL

## 2024-10-30 VITALS
DIASTOLIC BLOOD PRESSURE: 80 MMHG | BODY MASS INDEX: 29.86 KG/M2 | HEART RATE: 58 BPM | HEIGHT: 68 IN | WEIGHT: 197 LBS | SYSTOLIC BLOOD PRESSURE: 142 MMHG

## 2024-10-30 DIAGNOSIS — R00.2 PALPITATIONS: ICD-10-CM

## 2024-10-30 DIAGNOSIS — I10 ESSENTIAL (PRIMARY) HYPERTENSION: ICD-10-CM

## 2024-10-30 PROCEDURE — 99213 OFFICE O/P EST LOW 20 MIN: CPT | Mod: 25

## 2024-10-30 PROCEDURE — 93000 ELECTROCARDIOGRAM COMPLETE: CPT

## 2024-10-30 RX ORDER — ESCITALOPRAM OXALATE 20 MG/1
20 TABLET ORAL
Refills: 0 | Status: ACTIVE | COMMUNITY

## 2024-10-30 RX ORDER — BUSPIRONE HYDROCHLORIDE 10 MG/1
10 TABLET ORAL
Refills: 0 | Status: ACTIVE | COMMUNITY

## 2024-10-30 RX ORDER — ARIPIPRAZOLE 5 MG/1
5 TABLET ORAL
Refills: 0 | Status: ACTIVE | COMMUNITY

## 2024-11-07 ENCOUNTER — OUTPATIENT (OUTPATIENT)
Dept: OUTPATIENT SERVICES | Facility: HOSPITAL | Age: 59
LOS: 1 days | End: 2024-11-07
Payer: COMMERCIAL

## 2024-11-07 DIAGNOSIS — Z98.89 OTHER SPECIFIED POSTPROCEDURAL STATES: Chronic | ICD-10-CM

## 2024-11-07 DIAGNOSIS — Z00.8 ENCOUNTER FOR OTHER GENERAL EXAMINATION: ICD-10-CM

## 2024-11-07 DIAGNOSIS — R13.10 DYSPHAGIA, UNSPECIFIED: ICD-10-CM

## 2024-11-07 DIAGNOSIS — Z87.19 PERSONAL HISTORY OF OTHER DISEASES OF THE DIGESTIVE SYSTEM: Chronic | ICD-10-CM

## 2024-11-07 DIAGNOSIS — Z87.81 PERSONAL HISTORY OF (HEALED) TRAUMATIC FRACTURE: Chronic | ICD-10-CM

## 2024-11-07 PROCEDURE — 74246 X-RAY XM UPR GI TRC 2CNTRST: CPT

## 2024-11-07 PROCEDURE — 74246 X-RAY XM UPR GI TRC 2CNTRST: CPT | Mod: 26

## 2024-11-08 DIAGNOSIS — R13.10 DYSPHAGIA, UNSPECIFIED: ICD-10-CM

## 2024-12-11 ENCOUNTER — APPOINTMENT (OUTPATIENT)
Dept: ORTHOPEDIC SURGERY | Facility: CLINIC | Age: 59
End: 2024-12-11
Payer: COMMERCIAL

## 2024-12-11 ENCOUNTER — RESULT CHARGE (OUTPATIENT)
Age: 59
End: 2024-12-11

## 2024-12-11 DIAGNOSIS — M85.89 OTHER SPECIFIED DISORDERS OF BONE DENSITY AND STRUCTURE, MULTIPLE SITES: ICD-10-CM

## 2024-12-11 DIAGNOSIS — M23.91 UNSPECIFIED INTERNAL DERANGEMENT OF RIGHT KNEE: ICD-10-CM

## 2024-12-11 PROCEDURE — 99214 OFFICE O/P EST MOD 30 MIN: CPT

## 2024-12-11 PROCEDURE — 73562 X-RAY EXAM OF KNEE 3: CPT | Mod: RT

## 2024-12-11 RX ORDER — ACETAMINOPHEN 500 MG/1
500 TABLET ORAL 3 TIMES DAILY
Qty: 120 | Refills: 0 | Status: ACTIVE | COMMUNITY
Start: 2024-12-11 | End: 1900-01-01

## 2024-12-11 RX ORDER — MELOXICAM 7.5 MG/1
7.5 TABLET ORAL
Qty: 30 | Refills: 2 | Status: ACTIVE | COMMUNITY
Start: 2024-12-11 | End: 1900-01-01

## 2024-12-11 RX ORDER — CHOLECALCIFEROL (VITAMIN D3) 50 MCG
50 MCG TABLET ORAL
Qty: 30 | Refills: 3 | Status: ACTIVE | COMMUNITY
Start: 2024-12-11 | End: 1900-01-01

## 2025-01-27 ENCOUNTER — APPOINTMENT (OUTPATIENT)
Dept: ORTHOPEDIC SURGERY | Facility: CLINIC | Age: 60
End: 2025-01-27

## 2025-02-03 ENCOUNTER — APPOINTMENT (OUTPATIENT)
Dept: ORTHOPEDIC SURGERY | Facility: CLINIC | Age: 60
End: 2025-02-03

## 2025-02-12 ENCOUNTER — APPOINTMENT (OUTPATIENT)
Dept: ORTHOPEDIC SURGERY | Facility: CLINIC | Age: 60
End: 2025-02-12

## 2025-02-18 ENCOUNTER — APPOINTMENT (OUTPATIENT)
Dept: ORTHOPEDIC SURGERY | Facility: CLINIC | Age: 60
End: 2025-02-18

## 2025-02-18 DIAGNOSIS — M25.561 PAIN IN RIGHT KNEE: ICD-10-CM

## 2025-02-18 PROCEDURE — 20611 DRAIN/INJ JOINT/BURSA W/US: CPT | Mod: RT

## 2025-02-18 PROCEDURE — 99204 OFFICE O/P NEW MOD 45 MIN: CPT | Mod: 25

## 2025-04-11 ENCOUNTER — APPOINTMENT (OUTPATIENT)
Dept: NEUROLOGY | Facility: CLINIC | Age: 60
End: 2025-04-11
Payer: COMMERCIAL

## 2025-04-11 VITALS
HEIGHT: 68 IN | WEIGHT: 195 LBS | SYSTOLIC BLOOD PRESSURE: 155 MMHG | BODY MASS INDEX: 29.55 KG/M2 | DIASTOLIC BLOOD PRESSURE: 94 MMHG | HEART RATE: 55 BPM

## 2025-04-11 DIAGNOSIS — F33.1 MAJOR DEPRESSIVE DISORDER, RECURRENT, MODERATE: ICD-10-CM

## 2025-04-11 DIAGNOSIS — G47.33 OBSTRUCTIVE SLEEP APNEA (ADULT) (PEDIATRIC): ICD-10-CM

## 2025-04-11 DIAGNOSIS — R51.9 HEADACHE, UNSPECIFIED: ICD-10-CM

## 2025-04-11 DIAGNOSIS — R41.89 OTHER SYMPTOMS AND SIGNS INVOLVING COGNITIVE FUNCTIONS AND AWARENESS: ICD-10-CM

## 2025-04-11 PROCEDURE — G2211 COMPLEX E/M VISIT ADD ON: CPT | Mod: NC

## 2025-04-11 PROCEDURE — 99214 OFFICE O/P EST MOD 30 MIN: CPT

## 2025-04-11 RX ORDER — GABAPENTIN 300 MG/1
300 CAPSULE ORAL
Refills: 0 | Status: ACTIVE | COMMUNITY

## 2025-04-11 RX ORDER — ESCITALOPRAM OXALATE 20 MG/1
20 TABLET ORAL
Refills: 0 | Status: ACTIVE | COMMUNITY

## 2025-04-11 RX ORDER — BUSPIRONE HYDROCHLORIDE 10 MG/1
10 TABLET ORAL
Refills: 0 | Status: ACTIVE | COMMUNITY

## 2025-04-11 RX ORDER — ARIPIPRAZOLE 5 MG/1
5 TABLET ORAL
Refills: 0 | Status: ACTIVE | COMMUNITY

## 2025-04-16 ENCOUNTER — APPOINTMENT (OUTPATIENT)
Dept: NEUROLOGY | Facility: CLINIC | Age: 60
End: 2025-04-16
Payer: COMMERCIAL

## 2025-04-16 PROCEDURE — 95816 EEG AWAKE AND DROWSY: CPT

## 2025-04-18 ENCOUNTER — APPOINTMENT (OUTPATIENT)
Dept: NEUROLOGY | Facility: CLINIC | Age: 60
End: 2025-04-18

## 2025-04-30 ENCOUNTER — APPOINTMENT (OUTPATIENT)
Dept: CARDIOLOGY | Facility: CLINIC | Age: 60
End: 2025-04-30
Payer: COMMERCIAL

## 2025-04-30 VITALS
DIASTOLIC BLOOD PRESSURE: 74 MMHG | BODY MASS INDEX: 29.7 KG/M2 | WEIGHT: 196 LBS | SYSTOLIC BLOOD PRESSURE: 138 MMHG | HEIGHT: 68 IN | HEART RATE: 57 BPM

## 2025-04-30 DIAGNOSIS — E78.5 HYPERLIPIDEMIA, UNSPECIFIED: ICD-10-CM

## 2025-04-30 DIAGNOSIS — I10 ESSENTIAL (PRIMARY) HYPERTENSION: ICD-10-CM

## 2025-04-30 DIAGNOSIS — R00.2 PALPITATIONS: ICD-10-CM

## 2025-04-30 DIAGNOSIS — R07.9 CHEST PAIN, UNSPECIFIED: ICD-10-CM

## 2025-04-30 PROCEDURE — 99214 OFFICE O/P EST MOD 30 MIN: CPT

## 2025-04-30 PROCEDURE — G2211 COMPLEX E/M VISIT ADD ON: CPT | Mod: NC

## 2025-04-30 PROCEDURE — 93000 ELECTROCARDIOGRAM COMPLETE: CPT

## 2025-05-06 ENCOUNTER — NON-APPOINTMENT (OUTPATIENT)
Age: 60
End: 2025-05-06

## 2025-05-07 ENCOUNTER — NON-APPOINTMENT (OUTPATIENT)
Age: 60
End: 2025-05-07

## 2025-05-08 ENCOUNTER — APPOINTMENT (OUTPATIENT)
Dept: CARDIOLOGY | Facility: CLINIC | Age: 60
End: 2025-05-08
Payer: COMMERCIAL

## 2025-05-08 DIAGNOSIS — R07.9 CHEST PAIN, UNSPECIFIED: ICD-10-CM

## 2025-05-08 DIAGNOSIS — R00.2 PALPITATIONS: ICD-10-CM

## 2025-05-08 PROCEDURE — 93306 TTE W/DOPPLER COMPLETE: CPT

## 2025-05-09 ENCOUNTER — APPOINTMENT (OUTPATIENT)
Dept: NEUROLOGY | Facility: CLINIC | Age: 60
End: 2025-05-09
Payer: COMMERCIAL

## 2025-05-09 VITALS — DIASTOLIC BLOOD PRESSURE: 84 MMHG | HEART RATE: 84 BPM | SYSTOLIC BLOOD PRESSURE: 130 MMHG

## 2025-05-09 DIAGNOSIS — R41.89 OTHER SYMPTOMS AND SIGNS INVOLVING COGNITIVE FUNCTIONS AND AWARENESS: ICD-10-CM

## 2025-05-09 PROCEDURE — 99214 OFFICE O/P EST MOD 30 MIN: CPT

## 2025-05-22 ENCOUNTER — APPOINTMENT (OUTPATIENT)
Dept: NEUROPSYCHOLOGY | Facility: CLINIC | Age: 60
End: 2025-05-22

## 2025-05-22 ENCOUNTER — OUTPATIENT (OUTPATIENT)
Dept: OUTPATIENT SERVICES | Facility: HOSPITAL | Age: 60
LOS: 1 days | End: 2025-05-22
Payer: COMMERCIAL

## 2025-05-22 DIAGNOSIS — Z87.81 PERSONAL HISTORY OF (HEALED) TRAUMATIC FRACTURE: Chronic | ICD-10-CM

## 2025-05-22 DIAGNOSIS — Z87.19 PERSONAL HISTORY OF OTHER DISEASES OF THE DIGESTIVE SYSTEM: Chronic | ICD-10-CM

## 2025-05-22 DIAGNOSIS — Z98.89 OTHER SPECIFIED POSTPROCEDURAL STATES: Chronic | ICD-10-CM

## 2025-05-22 DIAGNOSIS — G31.84 MILD COGNITIVE IMPAIRMENT OF UNCERTAIN OR UNKNOWN ETIOLOGY: ICD-10-CM

## 2025-05-22 PROCEDURE — 96121 NUBHVL XM PHY/QHP EA ADDL HR: CPT

## 2025-05-22 PROCEDURE — 96116 NUBHVL XM PHYS/QHP 1ST HR: CPT

## 2025-05-23 DIAGNOSIS — G31.84 MILD COGNITIVE IMPAIRMENT OF UNCERTAIN OR UNKNOWN ETIOLOGY: ICD-10-CM

## 2025-06-17 ENCOUNTER — APPOINTMENT (OUTPATIENT)
Dept: ORTHOPEDIC SURGERY | Facility: CLINIC | Age: 60
End: 2025-06-17

## 2025-06-17 PROCEDURE — 20610 DRAIN/INJ JOINT/BURSA W/O US: CPT | Mod: RT

## 2025-06-17 PROCEDURE — 99214 OFFICE O/P EST MOD 30 MIN: CPT | Mod: 25

## 2025-06-27 ENCOUNTER — APPOINTMENT (OUTPATIENT)
Dept: NEUROPSYCHOLOGY | Facility: CLINIC | Age: 60
End: 2025-06-27

## 2025-07-07 ENCOUNTER — APPOINTMENT (OUTPATIENT)
Dept: NEUROPSYCHOLOGY | Facility: CLINIC | Age: 60
End: 2025-07-07

## 2025-07-24 ENCOUNTER — APPOINTMENT (OUTPATIENT)
Dept: NEUROPSYCHOLOGY | Facility: CLINIC | Age: 60
End: 2025-07-24

## 2025-08-15 RX ORDER — METOPROLOL TARTRATE 25 MG/1
25 TABLET ORAL
Qty: 2 | Refills: 0 | Status: ACTIVE | COMMUNITY
Start: 2025-08-15 | End: 1900-01-01

## 2025-08-21 ENCOUNTER — RESULT REVIEW (OUTPATIENT)
Age: 60
End: 2025-08-21

## 2025-08-21 ENCOUNTER — OUTPATIENT (OUTPATIENT)
Dept: OUTPATIENT SERVICES | Facility: HOSPITAL | Age: 60
LOS: 1 days | End: 2025-08-21
Payer: MEDICARE

## 2025-08-21 DIAGNOSIS — Z87.19 PERSONAL HISTORY OF OTHER DISEASES OF THE DIGESTIVE SYSTEM: Chronic | ICD-10-CM

## 2025-08-21 DIAGNOSIS — E78.5 HYPERLIPIDEMIA, UNSPECIFIED: ICD-10-CM

## 2025-08-21 DIAGNOSIS — I10 ESSENTIAL (PRIMARY) HYPERTENSION: ICD-10-CM

## 2025-08-21 DIAGNOSIS — Z00.8 ENCOUNTER FOR OTHER GENERAL EXAMINATION: ICD-10-CM

## 2025-08-21 DIAGNOSIS — Z98.89 OTHER SPECIFIED POSTPROCEDURAL STATES: Chronic | ICD-10-CM

## 2025-08-21 DIAGNOSIS — Z87.81 PERSONAL HISTORY OF (HEALED) TRAUMATIC FRACTURE: Chronic | ICD-10-CM

## 2025-08-21 PROCEDURE — 75574 CT ANGIO HRT W/3D IMAGE: CPT

## 2025-08-21 PROCEDURE — 75574 CT ANGIO HRT W/3D IMAGE: CPT | Mod: 26

## 2025-08-22 DIAGNOSIS — E78.5 HYPERLIPIDEMIA, UNSPECIFIED: ICD-10-CM

## 2025-08-22 DIAGNOSIS — I10 ESSENTIAL (PRIMARY) HYPERTENSION: ICD-10-CM
